# Patient Record
Sex: MALE | Race: OTHER | URBAN - METROPOLITAN AREA
[De-identification: names, ages, dates, MRNs, and addresses within clinical notes are randomized per-mention and may not be internally consistent; named-entity substitution may affect disease eponyms.]

---

## 2018-12-20 ENCOUNTER — EMERGENCY (EMERGENCY)
Facility: HOSPITAL | Age: 33
LOS: 0 days | Discharge: HOME | End: 2018-12-21
Attending: EMERGENCY MEDICINE | Admitting: EMERGENCY MEDICINE
Payer: COMMERCIAL

## 2018-12-20 VITALS — WEIGHT: 184.09 LBS | HEIGHT: 68 IN

## 2018-12-20 VITALS
DIASTOLIC BLOOD PRESSURE: 115 MMHG | SYSTOLIC BLOOD PRESSURE: 156 MMHG | RESPIRATION RATE: 18 BRPM | OXYGEN SATURATION: 99 % | HEIGHT: 67 IN | TEMPERATURE: 100 F | HEART RATE: 118 BPM | WEIGHT: 184.09 LBS

## 2018-12-20 PROCEDURE — 99285 EMERGENCY DEPT VISIT HI MDM: CPT | Mod: 25

## 2018-12-20 PROCEDURE — 99053 MED SERV 10PM-8AM 24 HR FAC: CPT

## 2018-12-20 RX ORDER — LIDOCAINE 4 G/100G
1 CREAM TOPICAL ONCE
Qty: 0 | Refills: 0 | Status: COMPLETED | OUTPATIENT
Start: 2018-12-20 | End: 2018-12-20

## 2018-12-20 RX ORDER — KETOROLAC TROMETHAMINE 30 MG/ML
30 SYRINGE (ML) INJECTION ONCE
Qty: 0 | Refills: 0 | Status: DISCONTINUED | OUTPATIENT
Start: 2018-12-20 | End: 2018-12-20

## 2018-12-20 RX ORDER — ACETAMINOPHEN 500 MG
975 TABLET ORAL ONCE
Qty: 0 | Refills: 0 | Status: COMPLETED | OUTPATIENT
Start: 2018-12-20 | End: 2018-12-21

## 2018-12-20 RX ADMIN — LIDOCAINE 1 PATCH: 4 CREAM TOPICAL at 23:59

## 2018-12-20 NOTE — ED PROVIDER NOTE - OBJECTIVE STATEMENT
32 y/o male in ED c/o lower back pain/spasms x 2 days.   states taking tylenol with minimal relief.    pt denies any trauma.   pt states works as  but has been on disability x months due to left shoulder pain.   pt denies any fever, HA, neck pain, cp, sob, n/v/d/abd pain

## 2018-12-20 NOTE — ED PROVIDER NOTE - NSFOLLOWUPINSTRUCTIONS_ED_ALL_ED_FT
please follow up with your doctor in 2-3 days.   take motrin and tylenol for pain.   may use ice or heating pads for comfort.   return to ED for any concerns

## 2018-12-21 VITALS
HEART RATE: 110 BPM | OXYGEN SATURATION: 98 % | DIASTOLIC BLOOD PRESSURE: 98 MMHG | SYSTOLIC BLOOD PRESSURE: 157 MMHG | RESPIRATION RATE: 16 BRPM

## 2018-12-21 VITALS — HEIGHT: 67 IN | WEIGHT: 184.09 LBS

## 2018-12-21 DIAGNOSIS — M54.5 LOW BACK PAIN: ICD-10-CM

## 2018-12-21 DIAGNOSIS — M54.9 DORSALGIA, UNSPECIFIED: ICD-10-CM

## 2018-12-21 DIAGNOSIS — M62.830 MUSCLE SPASM OF BACK: ICD-10-CM

## 2018-12-21 PROCEDURE — 99284 EMERGENCY DEPT VISIT MOD MDM: CPT

## 2018-12-21 RX ORDER — CYCLOBENZAPRINE HYDROCHLORIDE 10 MG/1
10 TABLET, FILM COATED ORAL ONCE
Qty: 0 | Refills: 0 | Status: COMPLETED | OUTPATIENT
Start: 2018-12-21 | End: 2018-12-21

## 2018-12-21 RX ORDER — CYCLOBENZAPRINE HYDROCHLORIDE 10 MG/1
1 TABLET, FILM COATED ORAL
Qty: 15 | Refills: 0
Start: 2018-12-21 | End: 2018-12-25

## 2018-12-21 RX ADMIN — CYCLOBENZAPRINE HYDROCHLORIDE 10 MILLIGRAM(S): 10 TABLET, FILM COATED ORAL at 13:16

## 2018-12-21 RX ADMIN — Medication 30 MILLIGRAM(S): at 00:00

## 2018-12-21 RX ADMIN — Medication 500 MILLIGRAM(S): at 13:16

## 2018-12-21 RX ADMIN — Medication 975 MILLIGRAM(S): at 00:00

## 2018-12-21 NOTE — ED STATDOCS - NS_ ATTENDINGSCRIBEDETAILS _ED_A_ED_FT
I, Harry Caldera MD,  performed the initial face to face bedside interview with this patient regarding history of present illness, review of symptoms and relevant past medical, social and family history.  I completed an independent physical examination.    The history, relevant review of systems, past medical and surgical history, medical decision making, and physical examination was documented by the scribe in my presence and I attest to the accuracy of the documentation.

## 2018-12-21 NOTE — ED STATDOCS - OBJECTIVE STATEMENT
34 y/o male with no relevant PMHx presents to the ED c/o back pain x2 days. Pt was seen in ED last night, but still with back pain so came back into ED. Pt has had similar sx in the past, improved with Valium. Pt has been taking Tylenol with minimal relief, was given a lidocaine patch in ED last night with no relief. Denies any trauma. NKDA.

## 2018-12-21 NOTE — ED STATDOCS - ATTENDING CONTRIBUTION TO CARE
I, Harry Caldera MD,  performed the initial face to face bedside interview with this patient regarding history of present illness, review of symptoms and relevant past medical, social and family history.  I completed an independent physical examination.  I was the initial provider who evaluated this patient. I have signed out the follow up of any pending tests (i.e. labs, radiological studies) to the ACP.  I have communicated the patient’s plan of care and disposition with the ACP.

## 2018-12-21 NOTE — ED STATDOCS - PROGRESS NOTE DETAILS
32 yo male with a PMH of liver transplant presents with b/l lower back pain x 2 days. Was seen in  ED last night, ordered medication and felt slightly better but now worse. Pain feels like spasm. Denies any trauma, fall. Will prescribe NSAIDs and muscles relaxer. Pt states parents are here who will drive him home. -Joshua Mayo PA-C

## 2018-12-21 NOTE — ED STATDOCS - CARE PLAN
Principal Discharge DX:	Acute bilateral low back pain without sciatica  Secondary Diagnosis:	Back spasm

## 2018-12-21 NOTE — ED ADULT NURSE NOTE - OBJECTIVE STATEMENT
pt. c/o of lower back spasm. pt. denies trauma, straining. pt. states pain began in the morning and got worse throughout the day. pt states hx of back spasms.

## 2018-12-21 NOTE — ED ADULT TRIAGE NOTE - CHIEF COMPLAINT QUOTE
pt presents to ed with back spasms, and was seen this AM in main ed. pt denies injury and denies numbness.

## 2018-12-21 NOTE — ED ADULT NURSE NOTE - CHPI ED NUR SYMPTOMS NEG
no deformity/no difficulty bearing weight/no abrasion/no numbness/no bruising/no weakness/no fever/no tingling

## 2018-12-21 NOTE — ED ADULT NURSE NOTE - NSIMPLEMENTINTERV_GEN_ALL_ED
Implemented All Universal Safety Interventions:  [place] to call system. Call bell, personal items and telephone within reach. Instruct patient to call for assistance. Room bathroom lighting operational. Non-slip footwear when patient is off stretcher. Physically safe environment: no spills, clutter or unnecessary equipment. Stretcher in lowest position, wheels locked, appropriate side rails in place.

## 2018-12-22 DIAGNOSIS — M54.5 LOW BACK PAIN: ICD-10-CM

## 2018-12-22 DIAGNOSIS — M54.9 DORSALGIA, UNSPECIFIED: ICD-10-CM

## 2019-09-17 NOTE — ED ADULT TRIAGE NOTE - NS ED NURSE BANDS TYPE
Name band; Yandy Bright M.D.:   patient awake alert seen sitting on stretcher NAD .   LUNGS CTAB no wheeze no crackle.   CARD RRR no m/r/g.    Abdomen soft NT ND no rebound no guarding no CVA tenderness.  exam performed with MD Harry in room. significant swelling to right testicle, fluctuant, nontender to palpation, no crepitus, no voerlying erythema or redness. no penile lesiosn or discharge.  EXT WWP no edema no calf tenderness CV 2+DP/PT bilaterally.   neuro A&Ox3 gait normal.    skin warm and dry no rash  HEENT: moist mucous membranes, PERRL, EOMI

## 2019-10-01 ENCOUNTER — INPATIENT (INPATIENT)
Facility: HOSPITAL | Age: 34
LOS: 3 days | Discharge: HOME | DRG: 305 | End: 2019-10-05
Attending: HOSPITALIST | Admitting: HOSPITALIST
Payer: COMMERCIAL

## 2019-10-01 VITALS
SYSTOLIC BLOOD PRESSURE: 197 MMHG | HEART RATE: 87 BPM | WEIGHT: 190.04 LBS | TEMPERATURE: 99 F | OXYGEN SATURATION: 97 % | RESPIRATION RATE: 18 BRPM | DIASTOLIC BLOOD PRESSURE: 147 MMHG | HEIGHT: 67 IN

## 2019-10-01 LAB
ALBUMIN SERPL ELPH-MCNC: 3.7 G/DL — SIGNIFICANT CHANGE UP (ref 3.3–5)
ALP SERPL-CCNC: 152 U/L — HIGH (ref 40–120)
ALT FLD-CCNC: 41 U/L — SIGNIFICANT CHANGE UP (ref 12–78)
ANION GAP SERPL CALC-SCNC: 5 MMOL/L — SIGNIFICANT CHANGE UP (ref 5–17)
APPEARANCE UR: CLEAR — SIGNIFICANT CHANGE UP
APTT BLD: 36.2 SEC — SIGNIFICANT CHANGE UP (ref 27.5–36.3)
AST SERPL-CCNC: 38 U/L — HIGH (ref 15–37)
BILIRUB SERPL-MCNC: 1.1 MG/DL — SIGNIFICANT CHANGE UP (ref 0.2–1.2)
BILIRUB UR-MCNC: ABNORMAL
BUN SERPL-MCNC: 19 MG/DL — SIGNIFICANT CHANGE UP (ref 7–23)
CALCIUM SERPL-MCNC: 8.8 MG/DL — SIGNIFICANT CHANGE UP (ref 8.5–10.1)
CHLORIDE SERPL-SCNC: 98 MMOL/L — SIGNIFICANT CHANGE UP (ref 96–108)
CO2 SERPL-SCNC: 30 MMOL/L — SIGNIFICANT CHANGE UP (ref 22–31)
COLOR SPEC: YELLOW — SIGNIFICANT CHANGE UP
CREAT SERPL-MCNC: 1.32 MG/DL — HIGH (ref 0.5–1.3)
DIFF PNL FLD: ABNORMAL
GLUCOSE SERPL-MCNC: 79 MG/DL — SIGNIFICANT CHANGE UP (ref 70–99)
GLUCOSE UR QL: NEGATIVE MG/DL — SIGNIFICANT CHANGE UP
HCT VFR BLD CALC: 58.4 % — CRITICAL HIGH (ref 39–50)
HGB BLD-MCNC: 20.2 G/DL — CRITICAL HIGH (ref 13–17)
INR BLD: 1.07 RATIO — SIGNIFICANT CHANGE UP (ref 0.88–1.16)
KETONES UR-MCNC: ABNORMAL
LEUKOCYTE ESTERASE UR-ACNC: ABNORMAL
MCHC RBC-ENTMCNC: 26.8 PG — LOW (ref 27–34)
MCHC RBC-ENTMCNC: 34.6 GM/DL — SIGNIFICANT CHANGE UP (ref 32–36)
MCV RBC AUTO: 77.4 FL — LOW (ref 80–100)
NITRITE UR-MCNC: NEGATIVE — SIGNIFICANT CHANGE UP
NT-PROBNP SERPL-SCNC: 134 PG/ML — HIGH (ref 0–125)
PH UR: 6 — SIGNIFICANT CHANGE UP (ref 5–8)
PLATELET # BLD AUTO: 221 K/UL — SIGNIFICANT CHANGE UP (ref 150–400)
POTASSIUM SERPL-MCNC: 3 MMOL/L — LOW (ref 3.5–5.3)
POTASSIUM SERPL-SCNC: 3 MMOL/L — LOW (ref 3.5–5.3)
PROT SERPL-MCNC: 8.9 GM/DL — HIGH (ref 6–8.3)
PROT UR-MCNC: 500 MG/DL
PROTHROM AB SERPL-ACNC: 11.9 SEC — SIGNIFICANT CHANGE UP (ref 10–12.9)
RBC # BLD: 7.55 M/UL — HIGH (ref 4.2–5.8)
RBC # FLD: 16.9 % — HIGH (ref 10.3–14.5)
SODIUM SERPL-SCNC: 133 MMOL/L — LOW (ref 135–145)
SP GR SPEC: 1.02 — SIGNIFICANT CHANGE UP (ref 1.01–1.02)
TROPONIN I SERPL-MCNC: <0.015 NG/ML — SIGNIFICANT CHANGE UP (ref 0.01–0.04)
UROBILINOGEN FLD QL: 1 MG/DL
WBC # BLD: 6.8 K/UL — SIGNIFICANT CHANGE UP (ref 3.8–10.5)
WBC # FLD AUTO: 6.8 K/UL — SIGNIFICANT CHANGE UP (ref 3.8–10.5)

## 2019-10-01 PROCEDURE — 70450 CT HEAD/BRAIN W/O DYE: CPT | Mod: 26

## 2019-10-01 PROCEDURE — 71046 X-RAY EXAM CHEST 2 VIEWS: CPT | Mod: 26

## 2019-10-01 RX ORDER — HYDRALAZINE HCL 50 MG
10 TABLET ORAL ONCE
Refills: 0 | Status: COMPLETED | OUTPATIENT
Start: 2019-10-01 | End: 2019-10-01

## 2019-10-01 RX ORDER — AMLODIPINE BESYLATE 2.5 MG/1
10 TABLET ORAL ONCE
Refills: 0 | Status: COMPLETED | OUTPATIENT
Start: 2019-10-01 | End: 2019-10-01

## 2019-10-01 RX ORDER — POTASSIUM CHLORIDE 20 MEQ
10 PACKET (EA) ORAL
Refills: 0 | Status: COMPLETED | OUTPATIENT
Start: 2019-10-01 | End: 2019-10-02

## 2019-10-01 RX ORDER — SODIUM CHLORIDE 9 MG/ML
1000 INJECTION INTRAMUSCULAR; INTRAVENOUS; SUBCUTANEOUS ONCE
Refills: 0 | Status: COMPLETED | OUTPATIENT
Start: 2019-10-01 | End: 2019-10-01

## 2019-10-01 RX ADMIN — AMLODIPINE BESYLATE 10 MILLIGRAM(S): 2.5 TABLET ORAL at 23:10

## 2019-10-01 RX ADMIN — Medication 10 MILLIGRAM(S): at 23:10

## 2019-10-01 NOTE — ED STATDOCS - PMH
Hemochromatosis, unspecified hemochromatosis type    Liver transplanted    No pertinent past medical history
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
## 2019-10-01 NOTE — ED STATDOCS - CLINICAL SUMMARY MEDICAL DECISION MAKING FREE TEXT BOX
Will send pt to main ED for further evaluation. hypertensive urgency will get labs, ekg, ct head and give hydralazine and norvasc
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
## 2019-10-01 NOTE — ED ADULT NURSE NOTE - NSIMPLEMENTINTERV_GEN_ALL_ED
Implemented All Universal Safety Interventions:  [place] to call system. Call bell, personal items and telephone within reach. Instruct patient to call for assistance. Room bathroom lighting operational. Non-slip footwear when patient is off stretcher. Physically safe environment: no spills, clutter or unnecessary equipment. Stretcher in lowest position, wheels locked, appropriate side rails in place.
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
## 2019-10-01 NOTE — ED STATDOCS - CARE PLAN
Principal Discharge DX:	Hypertensive urgency  Secondary Diagnosis:	Hypokalemia  Secondary Diagnosis:	Elevated hemoglobin

## 2019-10-02 DIAGNOSIS — I16.0 HYPERTENSIVE URGENCY: ICD-10-CM

## 2019-10-02 DIAGNOSIS — E87.6 HYPOKALEMIA: ICD-10-CM

## 2019-10-02 DIAGNOSIS — Z94.4 LIVER TRANSPLANT STATUS: Chronic | ICD-10-CM

## 2019-10-02 LAB
BASOPHILS # BLD AUTO: 0.05 K/UL — SIGNIFICANT CHANGE UP (ref 0–0.2)
BASOPHILS NFR BLD AUTO: 0.7 % — SIGNIFICANT CHANGE UP (ref 0–2)
EOSINOPHIL # BLD AUTO: 0.34 K/UL — SIGNIFICANT CHANGE UP (ref 0–0.5)
EOSINOPHIL NFR BLD AUTO: 4.9 % — SIGNIFICANT CHANGE UP (ref 0–6)
HCT VFR BLD CALC: 50.3 % — HIGH (ref 39–50)
HGB BLD-MCNC: 18.2 G/DL — HIGH (ref 13–17)
IMM GRANULOCYTES NFR BLD AUTO: 0.1 % — SIGNIFICANT CHANGE UP (ref 0–1.5)
LYMPHOCYTES # BLD AUTO: 1.64 K/UL — SIGNIFICANT CHANGE UP (ref 1–3.3)
LYMPHOCYTES # BLD AUTO: 23.5 % — SIGNIFICANT CHANGE UP (ref 13–44)
MCHC RBC-ENTMCNC: 27.5 PG — SIGNIFICANT CHANGE UP (ref 27–34)
MCHC RBC-ENTMCNC: 36.2 GM/DL — HIGH (ref 32–36)
MCV RBC AUTO: 75.9 FL — LOW (ref 80–100)
MONOCYTES # BLD AUTO: 0.67 K/UL — SIGNIFICANT CHANGE UP (ref 0–0.9)
MONOCYTES NFR BLD AUTO: 9.6 % — SIGNIFICANT CHANGE UP (ref 2–14)
NEUTROPHILS # BLD AUTO: 4.27 K/UL — SIGNIFICANT CHANGE UP (ref 1.8–7.4)
NEUTROPHILS NFR BLD AUTO: 61.2 % — SIGNIFICANT CHANGE UP (ref 43–77)
PLATELET # BLD AUTO: 193 K/UL — SIGNIFICANT CHANGE UP (ref 150–400)
RBC # BLD: 6.63 M/UL — HIGH (ref 4.2–5.8)
RBC # FLD: 16.1 % — HIGH (ref 10.3–14.5)
TSH SERPL-MCNC: 1.74 UU/ML — SIGNIFICANT CHANGE UP (ref 0.34–4.82)
WBC # BLD: 6.98 K/UL — SIGNIFICANT CHANGE UP (ref 3.8–10.5)
WBC # FLD AUTO: 6.98 K/UL — SIGNIFICANT CHANGE UP (ref 3.8–10.5)

## 2019-10-02 PROCEDURE — 36415 COLL VENOUS BLD VENIPUNCTURE: CPT

## 2019-10-02 PROCEDURE — 93010 ELECTROCARDIOGRAM REPORT: CPT

## 2019-10-02 PROCEDURE — 83540 ASSAY OF IRON: CPT

## 2019-10-02 PROCEDURE — 84244 ASSAY OF RENIN: CPT

## 2019-10-02 PROCEDURE — 93306 TTE W/DOPPLER COMPLETE: CPT | Mod: 26

## 2019-10-02 PROCEDURE — 80197 ASSAY OF TACROLIMUS: CPT

## 2019-10-02 PROCEDURE — 83735 ASSAY OF MAGNESIUM: CPT

## 2019-10-02 PROCEDURE — 82088 ASSAY OF ALDOSTERONE: CPT

## 2019-10-02 PROCEDURE — 83835 ASSAY OF METANEPHRINES: CPT

## 2019-10-02 PROCEDURE — 80053 COMPREHEN METABOLIC PANEL: CPT

## 2019-10-02 PROCEDURE — C8902: CPT

## 2019-10-02 PROCEDURE — 82384 ASSAY THREE CATECHOLAMINES: CPT

## 2019-10-02 PROCEDURE — 84443 ASSAY THYROID STIM HORMONE: CPT

## 2019-10-02 PROCEDURE — 93005 ELECTROCARDIOGRAM TRACING: CPT

## 2019-10-02 PROCEDURE — 82728 ASSAY OF FERRITIN: CPT

## 2019-10-02 PROCEDURE — 83550 IRON BINDING TEST: CPT

## 2019-10-02 PROCEDURE — 93306 TTE W/DOPPLER COMPLETE: CPT

## 2019-10-02 PROCEDURE — A9579: CPT

## 2019-10-02 PROCEDURE — 85027 COMPLETE CBC AUTOMATED: CPT

## 2019-10-02 PROCEDURE — 80048 BASIC METABOLIC PNL TOTAL CA: CPT

## 2019-10-02 PROCEDURE — 93975 VASCULAR STUDY: CPT

## 2019-10-02 PROCEDURE — 85025 COMPLETE CBC W/AUTO DIFF WBC: CPT

## 2019-10-02 RX ORDER — AMLODIPINE BESYLATE 2.5 MG/1
10 TABLET ORAL DAILY
Refills: 0 | Status: DISCONTINUED | OUTPATIENT
Start: 2019-10-02 | End: 2019-10-05

## 2019-10-02 RX ORDER — HYDRALAZINE HCL 50 MG
5 TABLET ORAL THREE TIMES A DAY
Refills: 0 | Status: DISCONTINUED | OUTPATIENT
Start: 2019-10-02 | End: 2019-10-02

## 2019-10-02 RX ORDER — TACROLIMUS 5 MG/1
1 CAPSULE ORAL
Qty: 0 | Refills: 0 | DISCHARGE

## 2019-10-02 RX ORDER — TACROLIMUS 5 MG/1
2 CAPSULE ORAL EVERY 12 HOURS
Refills: 0 | Status: DISCONTINUED | OUTPATIENT
Start: 2019-10-02 | End: 2019-10-05

## 2019-10-02 RX ORDER — TACROLIMUS 5 MG/1
2 CAPSULE ORAL
Qty: 0 | Refills: 0 | DISCHARGE

## 2019-10-02 RX ORDER — TACROLIMUS 5 MG/1
1 CAPSULE ORAL ONCE
Refills: 0 | Status: COMPLETED | OUTPATIENT
Start: 2019-10-02 | End: 2019-10-02

## 2019-10-02 RX ORDER — AMLODIPINE BESYLATE 2.5 MG/1
5 TABLET ORAL DAILY
Refills: 0 | Status: DISCONTINUED | OUTPATIENT
Start: 2019-10-02 | End: 2019-10-02

## 2019-10-02 RX ORDER — ACETAMINOPHEN 500 MG
650 TABLET ORAL EVERY 6 HOURS
Refills: 0 | Status: DISCONTINUED | OUTPATIENT
Start: 2019-10-02 | End: 2019-10-05

## 2019-10-02 RX ORDER — ACETAMINOPHEN 500 MG
1 TABLET ORAL
Qty: 0 | Refills: 0 | DISCHARGE

## 2019-10-02 RX ORDER — HYDRALAZINE HCL 50 MG
5 TABLET ORAL EVERY 6 HOURS
Refills: 0 | Status: DISCONTINUED | OUTPATIENT
Start: 2019-10-02 | End: 2019-10-02

## 2019-10-02 RX ORDER — HYDRALAZINE HCL 50 MG
5 TABLET ORAL EVERY 6 HOURS
Refills: 0 | Status: DISCONTINUED | OUTPATIENT
Start: 2019-10-02 | End: 2019-10-05

## 2019-10-02 RX ORDER — TACROLIMUS 5 MG/1
1 CAPSULE ORAL EVERY 12 HOURS
Refills: 0 | Status: DISCONTINUED | OUTPATIENT
Start: 2019-10-02 | End: 2019-10-02

## 2019-10-02 RX ADMIN — Medication 100 MILLIEQUIVALENT(S): at 00:13

## 2019-10-02 RX ADMIN — Medication 100 MILLIEQUIVALENT(S): at 03:00

## 2019-10-02 RX ADMIN — Medication 650 MILLIGRAM(S): at 09:19

## 2019-10-02 RX ADMIN — Medication 650 MILLIGRAM(S): at 16:36

## 2019-10-02 RX ADMIN — Medication 10 MILLIEQUIVALENT(S): at 02:25

## 2019-10-02 RX ADMIN — Medication 5 MILLIGRAM(S): at 04:13

## 2019-10-02 RX ADMIN — Medication 10 MILLIEQUIVALENT(S): at 01:05

## 2019-10-02 RX ADMIN — AMLODIPINE BESYLATE 10 MILLIGRAM(S): 2.5 TABLET ORAL at 16:35

## 2019-10-02 RX ADMIN — SODIUM CHLORIDE 1000 MILLILITER(S): 9 INJECTION INTRAMUSCULAR; INTRAVENOUS; SUBCUTANEOUS at 00:13

## 2019-10-02 RX ADMIN — TACROLIMUS 1 MILLIGRAM(S): 5 CAPSULE ORAL at 04:41

## 2019-10-02 RX ADMIN — Medication 650 MILLIGRAM(S): at 18:54

## 2019-10-02 RX ADMIN — TACROLIMUS 2 MILLIGRAM(S): 5 CAPSULE ORAL at 18:02

## 2019-10-02 RX ADMIN — Medication 5 MILLIGRAM(S): at 16:59

## 2019-10-02 RX ADMIN — TACROLIMUS 2 MILLIGRAM(S): 5 CAPSULE ORAL at 18:00

## 2019-10-02 RX ADMIN — TACROLIMUS 1 MILLIGRAM(S): 5 CAPSULE ORAL at 05:10

## 2019-10-02 RX ADMIN — Medication 100 MILLIEQUIVALENT(S): at 01:25

## 2019-10-02 RX ADMIN — Medication 650 MILLIGRAM(S): at 10:34

## 2019-10-02 RX ADMIN — Medication 5 MILLIGRAM(S): at 23:29

## 2019-10-02 RX ADMIN — SODIUM CHLORIDE 1000 MILLILITER(S): 9 INJECTION INTRAMUSCULAR; INTRAVENOUS; SUBCUTANEOUS at 02:13

## 2019-10-02 RX ADMIN — Medication 5 MILLIGRAM(S): at 11:50

## 2019-10-02 NOTE — H&P ADULT - NSHPPHYSICALEXAM_GEN_ALL_CORE
Vital Signs Last 24 Hrs  T(C): 36.6 (02 Oct 2019 03:37), Max: 37 (01 Oct 2019 20:34)  T(F): 97.9 (02 Oct 2019 03:37), Max: 98.6 (01 Oct 2019 20:34)  HR: 97 (02 Oct 2019 03:37) (87 - 97)  BP: 170/110 (02 Oct 2019 03:37) (170/110 - 197/147)  BP(mean): --  RR: 18 (01 Oct 2019 20:34) (18 - 18)  SpO2: 99% (02 Oct 2019 03:37) (97% - 99%)

## 2019-10-02 NOTE — PROGRESS NOTE ADULT - ASSESSMENT
34 year old male patient with elevated blood pressure and polycythemia     Hypertensive Urgency/Emergency  -blurry vision has resolved, still has HA  -f/u renin/Aldosterone levels, chaecholamines, JAK2, metanephrine urine  - IV hydralazine PRN  -amlodipine increased to 10mg  -vital signs per routine    Hypokalemia   -repleted  -follow up am potassium    Hyponatremia  -Na of 133  -f/u am labs  -consider sending out serum osms if persistently low    MARY  -likely pre-renal  -follow up BMP    Polycythemia  -Pt endorsed a history of Hemachromatosis that required frequent blood drawers with a Hematologist  -Hematology consult f/u    Liver transplant recipient  -cont prograf 2 mg BID    DVT ppx  -ambulation
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
## 2019-10-02 NOTE — PHARMACOTHERAPY INTERVENTION NOTE - COMMENTS
recommend increase amlodipine dose to 10 mg (outpatient dose). Patient endorses non-compliance with meds for >6 months

## 2019-10-02 NOTE — PHARMACOTHERAPY INTERVENTION NOTE - COMMENTS
Med rec is complete. Checked [facility] for med hx and confirmed with patient about his meds. Patient states that he has not taken his Norsvasc over 6 months now, because he did not have a PCP to provide the new RX and also had an insurance lapse; has new insurance now.

## 2019-10-02 NOTE — H&P ADULT - NSICDXPASTMEDICALHX_GEN_ALL_CORE_FT
PAST MEDICAL HISTORY:  Hemochromatosis, unspecified hemochromatosis type     Liver transplanted     No pertinent past medical history
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
## 2019-10-02 NOTE — ED ADULT NURSE REASSESSMENT NOTE - NS ED NURSE REASSESS COMMENT FT1
Pt alert and oriented-BP still elevated-Dr. Lopez made aware, 1x dose of hydralazine ordered and a prn dose of Norvasc PO ordered. Pt given hydralazine with small improvement-per Dr. Lopez pt okay to go to floor. Plan is to bring BP down slowly. Pt's transplant medication ordered-per pt "I take 2 mg twice daily, and I missed last nights dose". Dr. Lopez called and made aware-stat dose ordered and given and floor made aware that todays dose needs to be staggered due to missed dose. Pt ready for transport to floor-report given and pt put in for transport.
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
## 2019-10-03 DIAGNOSIS — D75.1 SECONDARY POLYCYTHEMIA: ICD-10-CM

## 2019-10-03 DIAGNOSIS — I16.0 HYPERTENSIVE URGENCY: ICD-10-CM

## 2019-10-03 LAB
ADD ON TEST-SPECIMEN IN LAB: SIGNIFICANT CHANGE UP
ALBUMIN SERPL ELPH-MCNC: 3.5 G/DL — SIGNIFICANT CHANGE UP (ref 3.3–5)
ALDOST SERPL-MCNC: 28.5 NG/DL — HIGH
ALP SERPL-CCNC: 134 U/L — HIGH (ref 40–120)
ALT FLD-CCNC: 36 U/L — SIGNIFICANT CHANGE UP (ref 12–78)
ANION GAP SERPL CALC-SCNC: 9 MMOL/L — SIGNIFICANT CHANGE UP (ref 5–17)
AST SERPL-CCNC: 30 U/L — SIGNIFICANT CHANGE UP (ref 15–37)
BILIRUB SERPL-MCNC: 1 MG/DL — SIGNIFICANT CHANGE UP (ref 0.2–1.2)
BUN SERPL-MCNC: 16 MG/DL — SIGNIFICANT CHANGE UP (ref 7–23)
CALCIUM SERPL-MCNC: 8.7 MG/DL — SIGNIFICANT CHANGE UP (ref 8.5–10.1)
CHLORIDE SERPL-SCNC: 98 MMOL/L — SIGNIFICANT CHANGE UP (ref 96–108)
CO2 SERPL-SCNC: 25 MMOL/L — SIGNIFICANT CHANGE UP (ref 22–31)
CREAT SERPL-MCNC: 1.1 MG/DL — SIGNIFICANT CHANGE UP (ref 0.5–1.3)
FERRITIN SERPL-MCNC: 55 NG/ML — SIGNIFICANT CHANGE UP (ref 30–400)
GLUCOSE SERPL-MCNC: 114 MG/DL — HIGH (ref 70–99)
HCT VFR BLD CALC: 54.6 % — HIGH (ref 39–50)
HGB BLD-MCNC: 18.7 G/DL — HIGH (ref 13–17)
IRON SATN MFR SERPL: 24 % — SIGNIFICANT CHANGE UP (ref 16–55)
IRON SATN MFR SERPL: 90 UG/DL — SIGNIFICANT CHANGE UP (ref 45–165)
MCHC RBC-ENTMCNC: 26.5 PG — LOW (ref 27–34)
MCHC RBC-ENTMCNC: 34.2 GM/DL — SIGNIFICANT CHANGE UP (ref 32–36)
MCV RBC AUTO: 77.3 FL — LOW (ref 80–100)
PLATELET # BLD AUTO: 229 K/UL — SIGNIFICANT CHANGE UP (ref 150–400)
POTASSIUM SERPL-MCNC: 3 MMOL/L — LOW (ref 3.5–5.3)
POTASSIUM SERPL-SCNC: 3 MMOL/L — LOW (ref 3.5–5.3)
PROT SERPL-MCNC: 8.5 GM/DL — HIGH (ref 6–8.3)
RBC # BLD: 7.06 M/UL — HIGH (ref 4.2–5.8)
RBC # FLD: 16.8 % — HIGH (ref 10.3–14.5)
RENIN DIRECT, PLASMA: 65.7 PG/ML — HIGH
SODIUM SERPL-SCNC: 132 MMOL/L — LOW (ref 135–145)
TIBC SERPL-MCNC: 376 UG/DL — SIGNIFICANT CHANGE UP (ref 220–430)
UIBC SERPL-MCNC: 286 UG/DL — SIGNIFICANT CHANGE UP (ref 110–370)
WBC # BLD: 8.47 K/UL — SIGNIFICANT CHANGE UP (ref 3.8–10.5)
WBC # FLD AUTO: 8.47 K/UL — SIGNIFICANT CHANGE UP (ref 3.8–10.5)

## 2019-10-03 PROCEDURE — 93010 ELECTROCARDIOGRAM REPORT: CPT

## 2019-10-03 RX ORDER — POTASSIUM CHLORIDE 20 MEQ
10 PACKET (EA) ORAL
Refills: 0 | Status: DISCONTINUED | OUTPATIENT
Start: 2019-10-03 | End: 2019-10-03

## 2019-10-03 RX ORDER — LISINOPRIL 2.5 MG/1
5 TABLET ORAL DAILY
Refills: 0 | Status: DISCONTINUED | OUTPATIENT
Start: 2019-10-03 | End: 2019-10-05

## 2019-10-03 RX ORDER — HYDRALAZINE HCL 50 MG
10 TABLET ORAL ONCE
Refills: 0 | Status: COMPLETED | OUTPATIENT
Start: 2019-10-03 | End: 2019-10-03

## 2019-10-03 RX ORDER — LANOLIN ALCOHOL/MO/W.PET/CERES
5 CREAM (GRAM) TOPICAL AT BEDTIME
Refills: 0 | Status: DISCONTINUED | OUTPATIENT
Start: 2019-10-03 | End: 2019-10-05

## 2019-10-03 RX ORDER — POTASSIUM CHLORIDE 20 MEQ
40 PACKET (EA) ORAL EVERY 4 HOURS
Refills: 0 | Status: COMPLETED | OUTPATIENT
Start: 2019-10-03 | End: 2019-10-03

## 2019-10-03 RX ADMIN — TACROLIMUS 2 MILLIGRAM(S): 5 CAPSULE ORAL at 05:21

## 2019-10-03 RX ADMIN — LISINOPRIL 5 MILLIGRAM(S): 2.5 TABLET ORAL at 17:13

## 2019-10-03 RX ADMIN — Medication 40 MILLIEQUIVALENT(S): at 11:50

## 2019-10-03 RX ADMIN — Medication 650 MILLIGRAM(S): at 03:16

## 2019-10-03 RX ADMIN — Medication 40 MILLIEQUIVALENT(S): at 17:13

## 2019-10-03 RX ADMIN — Medication 10 MILLIGRAM(S): at 03:15

## 2019-10-03 RX ADMIN — AMLODIPINE BESYLATE 10 MILLIGRAM(S): 2.5 TABLET ORAL at 05:21

## 2019-10-03 RX ADMIN — TACROLIMUS 2 MILLIGRAM(S): 5 CAPSULE ORAL at 17:13

## 2019-10-03 RX ADMIN — Medication 650 MILLIGRAM(S): at 02:46
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
## 2019-10-03 NOTE — CONSULT NOTE ADULT - PROBLEM SELECTOR RECOMMENDATION 2
likely secondary to Prograf and patient's noncompliance with antihypertensive medications    treatment as per medicine

## 2019-10-03 NOTE — PROGRESS NOTE ADULT - ATTENDING COMMENTS
Patient seen and examined with Family Medicine Residents Antwan Armendariz, Vicenta Triana, Orlando Flores and Poppy Moses on the Family Medicine Teaching Service.  Agree with history, physical, labs and plan which were reviewed in detail after a face to face encounter with the patient.
Patient seen and examined with Family Medicine Residents Antwan Armendariz, Orlando Flores and Poppy Moses on the Family Medicine Teaching Service.  Agree with history, physical, labs and plan which were reviewed in detail after a face to face encounter with the patient.

## 2019-10-03 NOTE — CONSULT NOTE ADULT - PROBLEM SELECTOR RECOMMENDATION 9
Secondary polycythemia due to Prograf  Phlebotomy can be considered for hematocrit of 55 or above  Patient advised to follow-up in our office after discharge  He does not require phlebotomy at this time

## 2019-10-03 NOTE — PROGRESS NOTE ADULT - ASSESSMENT
34 year old male patient with elevated blood pressure and polycythemia     Hypertensive Urgency/Emergency  -blurry vision has resolved, still has HA  -f/u renin/Aldosterone levels, chaecholamines, JAK2, metanephrine urine  - IV hydralazine PRN  -amlodipine increased to 10mg  -vital signs per routine    Hypokalemia   -repleted  -follow up am potassium    Hyponatremia  -Na of 133  -f/u am labs  -consider sending out serum osms if persistently low    MARY  -likely pre-renal  -follow up BMP    Polycythemia  -Pt endorsed a history of Hemachromatosis that required frequent blood drawers with a Hematologist  -Hematology consult f/u    Liver transplant recipient  -cont prograf 2 mg BID    DVT ppx  -ambulation 34 year old male patient with elevated blood pressure and polycythemia     Hypertensive Urgency/Emergency  -blurry vision has resolved, still has HA  -f/u heme/onc ordered labs  - IV hydralazine PRN with parameters  -amlodipine increased to 10mg  -start lisinopril 5mg PO QD   -vital signs per routine    Hypokalemia   -repleted  -follow up am potassium    Hyponatremia  -Na of 133  -f/u am labs  -consider sending out serum osms if persistently low    MARY  -likely pre-renal  -follow up BMP    Polycythemia  -Pt endorsed a history of Hemachromatosis that required frequent blood drawers with a Hematologist  -Hematology consult appreciated    Liver transplant recipient  -cont prograf 2 mg BID    DVT ppx  -ambulation

## 2019-10-04 LAB
ALDOST SERPL-MCNC: 27.3 NG/DL — HIGH
ANION GAP SERPL CALC-SCNC: 7 MMOL/L — SIGNIFICANT CHANGE UP (ref 5–17)
BUN SERPL-MCNC: 26 MG/DL — HIGH (ref 7–23)
CALCIUM SERPL-MCNC: 8.5 MG/DL — SIGNIFICANT CHANGE UP (ref 8.5–10.1)
CHLORIDE SERPL-SCNC: 105 MMOL/L — SIGNIFICANT CHANGE UP (ref 96–108)
CO2 SERPL-SCNC: 28 MMOL/L — SIGNIFICANT CHANGE UP (ref 22–31)
CREAT SERPL-MCNC: 1.41 MG/DL — HIGH (ref 0.5–1.3)
GLUCOSE SERPL-MCNC: 110 MG/DL — HIGH (ref 70–99)
HCT VFR BLD CALC: 52.4 % — HIGH (ref 39–50)
HGB BLD-MCNC: 17.8 G/DL — HIGH (ref 13–17)
MCHC RBC-ENTMCNC: 26.8 PG — LOW (ref 27–34)
MCHC RBC-ENTMCNC: 34 GM/DL — SIGNIFICANT CHANGE UP (ref 32–36)
MCV RBC AUTO: 78.8 FL — LOW (ref 80–100)
PLATELET # BLD AUTO: 233 K/UL — SIGNIFICANT CHANGE UP (ref 150–400)
POTASSIUM SERPL-MCNC: 3.5 MMOL/L — SIGNIFICANT CHANGE UP (ref 3.5–5.3)
POTASSIUM SERPL-SCNC: 3.5 MMOL/L — SIGNIFICANT CHANGE UP (ref 3.5–5.3)
RBC # BLD: 6.65 M/UL — HIGH (ref 4.2–5.8)
RBC # FLD: 16.6 % — HIGH (ref 10.3–14.5)
RENIN DIRECT, PLASMA: 81.5 PG/ML — HIGH
SODIUM SERPL-SCNC: 140 MMOL/L — SIGNIFICANT CHANGE UP (ref 135–145)
WBC # BLD: 7.05 K/UL — SIGNIFICANT CHANGE UP (ref 3.8–10.5)
WBC # FLD AUTO: 7.05 K/UL — SIGNIFICANT CHANGE UP (ref 3.8–10.5)

## 2019-10-04 PROCEDURE — 74185 MRA ABD W OR W/O CNTRST: CPT | Mod: 26

## 2019-10-04 PROCEDURE — 93975 VASCULAR STUDY: CPT | Mod: 26

## 2019-10-04 PROCEDURE — 93010 ELECTROCARDIOGRAM REPORT: CPT

## 2019-10-04 RX ADMIN — TACROLIMUS 2 MILLIGRAM(S): 5 CAPSULE ORAL at 17:58

## 2019-10-04 RX ADMIN — LISINOPRIL 5 MILLIGRAM(S): 2.5 TABLET ORAL at 05:39

## 2019-10-04 RX ADMIN — AMLODIPINE BESYLATE 10 MILLIGRAM(S): 2.5 TABLET ORAL at 05:39

## 2019-10-04 RX ADMIN — TACROLIMUS 2 MILLIGRAM(S): 5 CAPSULE ORAL at 05:39
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
## 2019-10-04 NOTE — PROGRESS NOTE ADULT - ASSESSMENT
34 year old male patient with elevated blood pressure and polycythemia     Hypertensive Urgency/Emergency  -blurry vision has resolved, still has HA  -f/u heme/onc ordered labs  - IV hydralazine PRN with parameters  -amlodipine increased to 10mg  - lisinopril 5mg PO QD  -Consider PO hydralazine due to elevated Cr  -Nephro consult appreciated  -f/u Renal US  -f/u MRI   -vital signs per routine  -D/c plan for tomorrow    Hypokalemia   -repleted  -follow up am potassium    Hyponatremia  -Na of 133  -f/u am labs  -consider sending out serum osms if persistently low    MARY  -likely pre-renal  -follow up BMP    Polycythemia  -Pt endorsed a history of Hemachromatosis that required frequent blood drawers with a Hematologist  -Hematology consult appreciated    Liver transplant recipient  -cont prograf 2 mg BID    DVT ppx  -ambulation 34 year old male patient with elevated blood pressure and polycythemia     Hypertensive Urgency/Emergency  -blurry vision has resolved, NO C/O HA  -elevated renin/aldosterone  - IV hydralazine PRN with parameters  -amlodipine increased to 10mg  - lisinopril 5mg PO QD  -Consider PO hydralazine due to elevated Cr  -Nephro consult appreciated  -f/u Renal US  -f/u MRI   -vital signs per routine  -D/c plan for tomorrow    Hypokalemia   -repleted  -follow up am potassium    Hyponatremia  -Na of 133  -f/u am labs  -consider sending out serum osms if persistently low    MARY  -likely pre-renal  -follow up BMP    Polycythemia  -Pt endorsed a history of Hemachromatosis that required frequent blood drawers with a Hematologist  -Hematology consult appreciated    Liver transplant recipient  -cont prograf 2 mg BID    DVT ppx  -ambulation

## 2019-10-04 NOTE — PROGRESS NOTE ADULT - SUBJECTIVE AND OBJECTIVE BOX
Pt has been seen and examined with FP resident, resident supervised agree with a/p       Patient is a 34y old  Male who presents with a chief complaint of Hypertensive Emergency (04 Oct 2019 14:17)        PHYSICAL EXAM:  Vital Signs Last 24 Hrs  T(C): 36.6 (04 Oct 2019 11:42), Max: 36.8 (03 Oct 2019 14:42)  T(F): 97.8 (04 Oct 2019 11:42), Max: 98.2 (03 Oct 2019 14:42)  HR: 94 (04 Oct 2019 11:42) (94 - 115)  BP: 137/94 (04 Oct 2019 11:42) (129/91 - 154/93)  BP(mean): --  RR: 19 (04 Oct 2019 11:42) (18 - 20)  SpO2: 98% (04 Oct 2019 11:42) (98% - 100%)  general- comfortable   -rs-aeeb,cta  -cvs-s1s2 normal   -p/a-soft,bs+  -extremity- no asymmetrical swelling noted   -cns- non focal         A/P    #renal doppler to follow    #monitor bp and adjust meds as per reading
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
## 2019-10-05 ENCOUNTER — TRANSCRIPTION ENCOUNTER (OUTPATIENT)
Age: 34
End: 2019-10-05

## 2019-10-05 VITALS
HEART RATE: 108 BPM | OXYGEN SATURATION: 98 % | TEMPERATURE: 97 F | DIASTOLIC BLOOD PRESSURE: 89 MMHG | SYSTOLIC BLOOD PRESSURE: 137 MMHG | RESPIRATION RATE: 19 BRPM

## 2019-10-05 LAB
ANION GAP SERPL CALC-SCNC: 7 MMOL/L — SIGNIFICANT CHANGE UP (ref 5–17)
BUN SERPL-MCNC: 28 MG/DL — HIGH (ref 7–23)
CALCIUM SERPL-MCNC: 8.6 MG/DL — SIGNIFICANT CHANGE UP (ref 8.5–10.1)
CHLORIDE SERPL-SCNC: 102 MMOL/L — SIGNIFICANT CHANGE UP (ref 96–108)
CO2 SERPL-SCNC: 28 MMOL/L — SIGNIFICANT CHANGE UP (ref 22–31)
CREAT SERPL-MCNC: 1.29 MG/DL — SIGNIFICANT CHANGE UP (ref 0.5–1.3)
GLUCOSE SERPL-MCNC: 92 MG/DL — SIGNIFICANT CHANGE UP (ref 70–99)
HCT VFR BLD CALC: 52 % — HIGH (ref 39–50)
HGB BLD-MCNC: 18.1 G/DL — HIGH (ref 13–17)
MCHC RBC-ENTMCNC: 27.2 PG — SIGNIFICANT CHANGE UP (ref 27–34)
MCHC RBC-ENTMCNC: 34.8 GM/DL — SIGNIFICANT CHANGE UP (ref 32–36)
MCV RBC AUTO: 78.2 FL — LOW (ref 80–100)
PLATELET # BLD AUTO: 212 K/UL — SIGNIFICANT CHANGE UP (ref 150–400)
POTASSIUM SERPL-MCNC: 3.4 MMOL/L — LOW (ref 3.5–5.3)
POTASSIUM SERPL-SCNC: 3.4 MMOL/L — LOW (ref 3.5–5.3)
RBC # BLD: 6.65 M/UL — HIGH (ref 4.2–5.8)
RBC # FLD: 16.2 % — HIGH (ref 10.3–14.5)
SODIUM SERPL-SCNC: 137 MMOL/L — SIGNIFICANT CHANGE UP (ref 135–145)
TACROLIMUS SERPL-MCNC: 5.5 NG/ML — SIGNIFICANT CHANGE UP
WBC # BLD: 7.02 K/UL — SIGNIFICANT CHANGE UP (ref 3.8–10.5)
WBC # FLD AUTO: 7.02 K/UL — SIGNIFICANT CHANGE UP (ref 3.8–10.5)

## 2019-10-05 RX ORDER — POTASSIUM CHLORIDE 20 MEQ
40 PACKET (EA) ORAL ONCE
Refills: 0 | Status: COMPLETED | OUTPATIENT
Start: 2019-10-05 | End: 2019-10-05

## 2019-10-05 RX ORDER — LISINOPRIL 2.5 MG/1
1 TABLET ORAL
Qty: 30 | Refills: 0
Start: 2019-10-05 | End: 2019-11-03

## 2019-10-05 RX ORDER — AMLODIPINE BESYLATE 2.5 MG/1
1 TABLET ORAL
Qty: 30 | Refills: 0
Start: 2019-10-05 | End: 2019-11-03

## 2019-10-05 RX ORDER — AMLODIPINE BESYLATE 2.5 MG/1
1 TABLET ORAL
Qty: 0 | Refills: 0 | DISCHARGE

## 2019-10-05 RX ADMIN — AMLODIPINE BESYLATE 10 MILLIGRAM(S): 2.5 TABLET ORAL at 05:38

## 2019-10-05 RX ADMIN — TACROLIMUS 2 MILLIGRAM(S): 5 CAPSULE ORAL at 05:37

## 2019-10-05 RX ADMIN — Medication 5 MILLIGRAM(S): at 05:38

## 2019-10-05 RX ADMIN — Medication 40 MILLIEQUIVALENT(S): at 11:46

## 2019-10-05 RX ADMIN — LISINOPRIL 5 MILLIGRAM(S): 2.5 TABLET ORAL at 05:38
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
## 2019-10-05 NOTE — DISCHARGE NOTE PROVIDER - PROVIDER TOKENS
PROVIDER:[TOKEN:[[id number]],FOLLOWUP:[1 week]],PROVIDER:[TOKEN:[[id number]],FOLLOWUP:[1 week]]

## 2019-10-05 NOTE — DISCHARGE NOTE PROVIDER - NSDCCPCAREPLAN_GEN_ALL_CORE_FT
PRINCIPAL DISCHARGE DIAGNOSIS  Diagnosis: Hypertensive emergency  Assessment and Plan of Treatment: Presented with headache and blurry vision.  On admission blood pressure was 197/147  Initially treated with norvasc and hydralazine.  Blood pressure now stable  Blood pressure was controlled with  Lisinopril 5 mg QD and amlodipine 10 mg QD. Continue these medications.  Avoid eating salty foods.  Follow up at [facility] within 1 week      SECONDARY DISCHARGE DIAGNOSES  Diagnosis: Elevated hemoglobin  Assessment and Plan of Treatment: Hx of polycythemia  Follow up with hematologist/oncologist as an outpatient    Diagnosis: Hypokalemia  Assessment and Plan of Treatment: Potassium supplementation was given  Follow up at [facility] within 1 week. Repeat BMP next week.

## 2019-10-05 NOTE — DISCHARGE NOTE PROVIDER - CARE PROVIDER_API CALL
Co, Jori ALVA)  Internal Medicine  [address] [place] [address]  Phone: [phone]  Fax: [phone]  Follow Up Time: 1 week    Sera Donnelly)  Internal Medicine; Nephrology  [address], [address]  [place] [address]  Phone: [phone]  Fax: [phone]  Follow Up Time: 1 week

## 2019-10-05 NOTE — DISCHARGE NOTE PROVIDER - NSDCFUADDINST_GEN_ALL_CORE_FT
If chest pain, shortness of breath, abdominal pain, diarrhea, fall, loss of consciousness, headache, change in vision, facial droop or weakness occur, return to the emergency department.    Follow up physician at [facility] and Dr. Donnelly, Nephrologist as an outpatient

## 2019-10-05 NOTE — DISCHARGE NOTE NURSING/CASE MANAGEMENT/SOCIAL WORK - PATIENT PORTAL LINK FT
You can access the FollowMyHealth Patient Portal offered by [facility] by registering at the following website: [email or web address]. By joining [employer]’s FollowMyHealth portal, you will also be able to view your health information using other applications (apps) compatible with our system.

## 2019-10-05 NOTE — DISCHARGE NOTE PROVIDER - NSDCCAREPROVSEEN_GEN_ALL_CORE_FT
Marcello, Natalia Lamb, Maksim Triana, Vicenta Donnelly, Sera Moses, Poppy Vences, Johnnie Dow, Jcarlos Schwarz

## 2019-10-05 NOTE — PROGRESS NOTE ADULT - SUBJECTIVE AND OBJECTIVE BOX
Pt has been seen and examined with FP resident, resident supervised agree with a/p       Patient is a 34y old  Male who presents with a chief complaint of Hypertensive Emergency (04 Oct 2019 20:13)        PHYSICAL EXAM:  Vital Signs Last 24 Hrs  T(C): 36.3 (05 Oct 2019 12:04), Max: 36.7 (04 Oct 2019 22:19)  T(F): 97.4 (05 Oct 2019 12:04), Max: 98.1 (04 Oct 2019 22:19)  HR: 108 (05 Oct 2019 12:04) (94 - 108)  BP: 137/89 (05 Oct 2019 12:04) (137/89 - 156/113)  BP(mean): --  RR: 19 (05 Oct 2019 12:04) (18 - 19)  SpO2: 98% (05 Oct 2019 12:04) (95% - 98%)  general- comfortable   -rs-aeeb,cta  -cvs-s1s2 normal   -p/a-soft,bs+  -extremity- no asymmetrical swelling noted   -cns- non focal         A/P    #d/c today, time spent 50 minutes     #Discussed with pt to be compliant with meds and follow up

## 2019-10-06 LAB — RENIN PLAS-CCNC: 4.61 NG/ML/HR — SIGNIFICANT CHANGE UP (ref 0.17–5.38)

## 2019-10-07 LAB
CREAT ?TM UR-MCNC: 172 MG/DL — SIGNIFICANT CHANGE UP (ref 20–320)
DOPAMINE UR-MCNC: 249 MCG/G CR — SIGNIFICANT CHANGE UP (ref 40–390)
EPINEPH UR-MCNC: 8 MCG/G CR — SIGNIFICANT CHANGE UP (ref 2–16)
EPINEPHRINE/NOREPINEPHRINE - RANDOM URINE: 98 MCG/G CR — HIGH (ref 9–74)
NOREPINEPH 24H UR-MCNC: 90 MCG/G CR — HIGH (ref 7–65)

## 2019-10-10 DIAGNOSIS — N17.9 ACUTE KIDNEY FAILURE, UNSPECIFIED: ICD-10-CM

## 2019-10-10 DIAGNOSIS — I16.1 HYPERTENSIVE EMERGENCY: ICD-10-CM

## 2019-10-10 DIAGNOSIS — D75.1 SECONDARY POLYCYTHEMIA: ICD-10-CM

## 2019-10-10 DIAGNOSIS — Z91.14 PATIENT'S OTHER NONCOMPLIANCE WITH MEDICATION REGIMEN: ICD-10-CM

## 2019-10-10 DIAGNOSIS — Z94.4 LIVER TRANSPLANT STATUS: ICD-10-CM

## 2019-10-10 DIAGNOSIS — E87.1 HYPO-OSMOLALITY AND HYPONATREMIA: ICD-10-CM

## 2019-10-10 DIAGNOSIS — E87.6 HYPOKALEMIA: ICD-10-CM

## 2020-12-23 ENCOUNTER — OUTPATIENT (OUTPATIENT)
Dept: OUTPATIENT SERVICES | Facility: HOSPITAL | Age: 35
LOS: 1 days | End: 2020-12-23
Payer: MEDICAID

## 2020-12-23 DIAGNOSIS — Z20.828 CONTACT WITH AND (SUSPECTED) EXPOSURE TO OTHER VIRAL COMMUNICABLE DISEASES: ICD-10-CM

## 2020-12-23 DIAGNOSIS — Z94.4 LIVER TRANSPLANT STATUS: Chronic | ICD-10-CM

## 2020-12-23 PROCEDURE — U0003: CPT

## 2020-12-24 DIAGNOSIS — Z20.828 CONTACT WITH AND (SUSPECTED) EXPOSURE TO OTHER VIRAL COMMUNICABLE DISEASES: ICD-10-CM

## 2020-12-24 PROBLEM — Z94.4 LIVER TRANSPLANT STATUS: Chronic | Status: ACTIVE | Noted: 2019-10-02

## 2020-12-24 LAB — SARS-COV-2 RNA SPEC QL NAA+PROBE: SIGNIFICANT CHANGE UP

## 2021-04-30 NOTE — ED ADULT NURSE NOTE - NSIMPLEMENTINTERV_GEN_ALL_ED
show
Implemented All Universal Safety Interventions:  [place] to call system. Call bell, personal items and telephone within reach. Instruct patient to call for assistance. Room bathroom lighting operational. Non-slip footwear when patient is off stretcher. Physically safe environment: no spills, clutter or unnecessary equipment. Stretcher in lowest position, wheels locked, appropriate side rails in place.

## 2021-08-30 ENCOUNTER — NON-APPOINTMENT (OUTPATIENT)
Age: 36
End: 2021-08-30

## 2021-08-31 ENCOUNTER — APPOINTMENT (OUTPATIENT)
Dept: CARDIOLOGY | Facility: CLINIC | Age: 36
End: 2021-08-31
Payer: MEDICAID

## 2021-08-31 ENCOUNTER — NON-APPOINTMENT (OUTPATIENT)
Age: 36
End: 2021-08-31

## 2021-08-31 VITALS
HEART RATE: 85 BPM | OXYGEN SATURATION: 96 % | BODY MASS INDEX: 34.06 KG/M2 | WEIGHT: 217 LBS | SYSTOLIC BLOOD PRESSURE: 136 MMHG | HEIGHT: 67 IN | DIASTOLIC BLOOD PRESSURE: 88 MMHG

## 2021-08-31 DIAGNOSIS — K75.4 AUTOIMMUNE HEPATITIS: ICD-10-CM

## 2021-08-31 DIAGNOSIS — Z94.4 LIVER TRANSPLANT STATUS: ICD-10-CM

## 2021-08-31 DIAGNOSIS — R55 SYNCOPE AND COLLAPSE: ICD-10-CM

## 2021-08-31 DIAGNOSIS — I10 ESSENTIAL (PRIMARY) HYPERTENSION: ICD-10-CM

## 2021-08-31 DIAGNOSIS — Z87.19 PERSONAL HISTORY OF OTHER DISEASES OF THE DIGESTIVE SYSTEM: ICD-10-CM

## 2021-08-31 PROCEDURE — 93000 ELECTROCARDIOGRAM COMPLETE: CPT

## 2021-08-31 PROCEDURE — 99203 OFFICE O/P NEW LOW 30 MIN: CPT

## 2021-08-31 RX ORDER — AMLODIPINE BESYLATE 10 MG/1
10 TABLET ORAL DAILY
Qty: 90 | Refills: 0 | Status: ACTIVE | COMMUNITY

## 2021-08-31 RX ORDER — TACROLIMUS 1 MG/1
1 CAPSULE, GELATIN COATED ORAL TWICE DAILY
Refills: 0 | Status: ACTIVE | COMMUNITY

## 2021-08-31 RX ORDER — LISINOPRIL 5 MG/1
5 TABLET ORAL DAILY
Qty: 30 | Refills: 0 | Status: ACTIVE | COMMUNITY

## 2021-09-01 PROBLEM — I10 HTN (HYPERTENSION), BENIGN: Status: ACTIVE | Noted: 2021-08-31

## 2021-09-01 PROBLEM — Z94.4 LIVER TRANSPLANTED: Status: ACTIVE | Noted: 2021-08-31

## 2021-09-01 PROBLEM — R55 SYNCOPE: Status: ACTIVE | Noted: 2021-08-27

## 2021-09-01 NOTE — DISCUSSION/SUMMARY
[FreeTextEntry1] : The patient is a 36-year-old gentleman s/p liver transplant secondary to autoimmune hepatitis C, hypertension with syncope most likely dehydration. \par #1 CV- normal ECG, echo and exercise stress test to confirm\par #2 Htn- safe to continue amlodipine and lisinopril but BP borderline today\par #3 GI- s/p transplant on prograff, needs level\par #4 General- We discussed adherence to a Mediterranean diet, weight loss and at least 30 minutes of daily exercise.\par

## 2021-09-01 NOTE — HISTORY OF PRESENT ILLNESS
[FreeTextEntry1] : Kurtis is a 36-year-old gentleman liver transplant at 17 yo secondary to autoimmune hepatitis C, hypertension who was sent for cardiac evaluation. He had a near syncopal episode secondary to dehydration.

## 2021-09-27 ENCOUNTER — APPOINTMENT (OUTPATIENT)
Dept: CV DIAGNOSTICS | Facility: HOSPITAL | Age: 36
End: 2021-09-27

## 2021-09-27 ENCOUNTER — OUTPATIENT (OUTPATIENT)
Dept: OUTPATIENT SERVICES | Facility: HOSPITAL | Age: 36
LOS: 1 days | End: 2021-09-27
Payer: MEDICARE

## 2021-09-27 DIAGNOSIS — I10 ESSENTIAL (PRIMARY) HYPERTENSION: ICD-10-CM

## 2021-09-27 DIAGNOSIS — Z94.4 LIVER TRANSPLANT STATUS: Chronic | ICD-10-CM

## 2021-09-27 PROCEDURE — 93018 CV STRESS TEST I&R ONLY: CPT

## 2021-09-27 PROCEDURE — 93016 CV STRESS TEST SUPVJ ONLY: CPT

## 2021-09-27 PROCEDURE — 93017 CV STRESS TEST TRACING ONLY: CPT

## 2021-09-28 RX ORDER — HYDROCHLOROTHIAZIDE 12.5 MG/1
12.5 TABLET ORAL
Qty: 25 | Refills: 2 | Status: ACTIVE | COMMUNITY
Start: 1900-01-01 | End: 1900-01-01

## 2021-10-25 ENCOUNTER — APPOINTMENT (OUTPATIENT)
Dept: CV DIAGNOSITCS | Facility: HOSPITAL | Age: 36
End: 2021-10-25

## 2022-03-05 NOTE — PATIENT PROFILE ADULT - FALL HARM RISK TYPE OF ASSESSMENT
(0) lying quietly, normal position, moves easily/(0) content, relaxed/(0) no cry (awake or asleep)/(0) no particular expression or smile/(0) normal position or relaxed admission

## 2022-04-11 ENCOUNTER — EMERGENCY (EMERGENCY)
Facility: HOSPITAL | Age: 37
LOS: 1 days | Discharge: HOME | End: 2022-04-11
Attending: EMERGENCY MEDICINE | Admitting: EMERGENCY MEDICINE
Payer: SELF-PAY

## 2022-04-11 VITALS
HEART RATE: 83 BPM | SYSTOLIC BLOOD PRESSURE: 171 MMHG | WEIGHT: 184.97 LBS | TEMPERATURE: 98 F | HEIGHT: 67 IN | DIASTOLIC BLOOD PRESSURE: 126 MMHG | OXYGEN SATURATION: 96 % | RESPIRATION RATE: 18 BRPM

## 2022-04-11 VITALS
DIASTOLIC BLOOD PRESSURE: 101 MMHG | HEART RATE: 80 BPM | OXYGEN SATURATION: 95 % | SYSTOLIC BLOOD PRESSURE: 155 MMHG | RESPIRATION RATE: 16 BRPM

## 2022-04-11 DIAGNOSIS — Z94.4 LIVER TRANSPLANT STATUS: Chronic | ICD-10-CM

## 2022-04-11 PROCEDURE — 99283 EMERGENCY DEPT VISIT LOW MDM: CPT | Mod: 25

## 2022-04-11 PROCEDURE — 12001 RPR S/N/AX/GEN/TRNK 2.5CM/<: CPT

## 2022-04-11 PROCEDURE — 99283 EMERGENCY DEPT VISIT LOW MDM: CPT

## 2022-04-11 RX ORDER — AMLODIPINE BESYLATE 2.5 MG/1
10 TABLET ORAL ONCE
Refills: 0 | Status: COMPLETED | OUTPATIENT
Start: 2022-04-11 | End: 2022-04-11

## 2022-04-11 RX ADMIN — AMLODIPINE BESYLATE 10 MILLIGRAM(S): 2.5 TABLET ORAL at 09:55

## 2022-04-11 NOTE — ED PROVIDER NOTE - CLINICAL SUMMARY MEDICAL DECISION MAKING FREE TEXT BOX
ROSALINDA Youngblood. PGY-3: 35 y/o M presenting with laceration after cutting with , new blade. Will irrigate wound, lac repair. Patient UTD on tetanus. DC with return for removal of sutures

## 2022-04-11 NOTE — ED PROVIDER NOTE - OBJECTIVE STATEMENT
37 y/o M with PMH of hemochromatosis s/p 35 y/o M with PMH of hemochromatosis s/p liver transplant presenting with thumb laceration. Patient states was using a  and cut left thumb. States was using brand new blade, not dirty. Patient has bleeding controlled with gauze at this time.  Got tetanus shot last year.  Patient took all anti-rejection meds this AM, did not take HTN meds

## 2022-04-11 NOTE — ED PROVIDER NOTE - PHYSICAL EXAMINATION
gen: well appearing  Mentation: AAO x 3  psych: mood appropriate  ENT: airway patent  Eyes: conjunctivae clear bilaterally  Cardio: RRR, no m/r/g  Resp: breathing unlabored  Neuro: sensation and motor function intact  Skin: 2 cm laceration on palmar aspect of thumb, just distal to MCP joint  MSK: normal movement of all extremities  Lymph/Vasc: no LE edema

## 2022-04-11 NOTE — ED PROCEDURE NOTE - ATTENDING CONTRIBUTION TO CARE
I reviewed the Fellow's's note and agree with the documented findings and plan of care.  Noreen Baeza MD

## 2022-04-11 NOTE — ED PROVIDER NOTE - NSFOLLOWUPINSTRUCTIONS_ED_ALL_ED_FT
1. Return to ED for worsening, progressive or any other concerning symptoms   2. Sutures to be removed in 7 days    Laceration    A laceration is a cut that goes through all of the layers of the skin and into the tissue that is right under the skin. Some lacerations heal on their own. Others need to be closed with skin adhesive strips, skin glue, stitches (sutures), or staples. Proper laceration care minimizes the risk of infection and helps the laceration to heal better.  If non-absorbable stitches or staples have been placed, they must be taken out within the time frame instructed by your healthcare provider.    SEEK IMMEDIATE MEDICAL CARE IF YOU HAVE ANY OF THE FOLLOWING SYMPTOMS: swelling around the wound, worsening pain, drainage from the wound, red streaking going away from your wound, inability to move finger or toe near the laceration, or discoloration of skin near the laceration.

## 2022-04-11 NOTE — ED PROCEDURE NOTE - PROCEDURE ADDITIONAL DETAILS
Digital block performed with 2.5 mL of lidocaine after cleaning with chlorhexidine. Tourniquet placed at base of the thumb to control bleeding. Small arterial bleed noted. 4 total sutures (4.0 synthetic) placed (1 removed) with successful stopping of the bleeding after removal of tourniquet. -Kacy BALLARD PGY5

## 2022-04-11 NOTE — ED PROVIDER NOTE - PATIENT PORTAL LINK FT
You can access the FollowMyHealth Patient Portal offered by [facility] by registering at the following website: [email or web address]. By joining [employer]’s FollowMyHealth portal, you will also be able to view your health information using other applications (apps) compatible with our system.

## 2022-04-11 NOTE — ED PROVIDER NOTE - ATTENDING CONTRIBUTION TO CARE
I performed a history and physical exam of the patient and discussed their management with the fellow. I reviewed the fellow's note and agree with the documented findings and plan of care.  Noreen Baeza MD

## 2022-04-11 NOTE — ED ADULT NURSE NOTE - OBJECTIVE STATEMENT
0855 36 yr old HM c/o laceration to left thumb 1 hr ago at work on a . A&Ox4. ambulatory. Laceration noted with bleeding at left proximal palmar thumb. full ROM. Past surgical hx of liver transplant 2002. Did not take Norvasc yet this morning. states he took his Prograf. Denies dizziness or HA.

## 2022-04-18 ENCOUNTER — EMERGENCY (EMERGENCY)
Facility: HOSPITAL | Age: 37
LOS: 1 days | Discharge: HOME | End: 2022-04-18
Attending: EMERGENCY MEDICINE
Payer: SELF-PAY

## 2022-04-18 VITALS
OXYGEN SATURATION: 97 % | SYSTOLIC BLOOD PRESSURE: 144 MMHG | DIASTOLIC BLOOD PRESSURE: 92 MMHG | HEART RATE: 74 BPM | TEMPERATURE: 98 F | HEIGHT: 67 IN | WEIGHT: 188.05 LBS | RESPIRATION RATE: 16 BRPM

## 2022-04-18 DIAGNOSIS — Z94.4 LIVER TRANSPLANT STATUS: Chronic | ICD-10-CM

## 2022-04-18 PROBLEM — I10 ESSENTIAL (PRIMARY) HYPERTENSION: Chronic | Status: ACTIVE | Noted: 2022-04-11

## 2022-04-18 PROCEDURE — 99282 EMERGENCY DEPT VISIT SF MDM: CPT

## 2022-04-18 PROCEDURE — G0463: CPT

## 2022-04-18 NOTE — ED PROVIDER NOTE - ATTENDING CONTRIBUTION TO CARE
I performed a history and physical exam of the patient and discussed their management with the resident and /or advanced care provider. I reviewed the resident and /or ACP's note and agree with the documented findings and plan of care. My medical decision making and observations are found above.  Nl rom of thumb, no cellulitis

## 2022-04-18 NOTE — ED PROVIDER NOTE - OBJECTIVE STATEMENT
35 yo male in 35 L presents to the ER for evaluation of left thumb sutures to be removed.  Pt awake alert oriented x3 states "I had these three sutures put in one week ago after cutting my finger with a  accidentally.   PT with no signs / symptoms of infection.   Denies fevers and chills. Denies pain and discomfort at this time.

## 2022-04-18 NOTE — ED PROVIDER NOTE - PATIENT PORTAL LINK FT
You can access the FollowMyHealth Patient Portal offered by [facility] by registering at the following website: [email or web address]. By joining [employer]’s FollowMyHealth portal, you will also be able to view your health information using other applications (apps) compatible with our system.

## 2022-04-18 NOTE — ED PROVIDER NOTE - NSFOLLOWUPINSTRUCTIONS_ED_ALL_ED_FT
Thank you for visiting our Emergency Department, it has been a pleasure taking part in your healthcare. Please follow up with your primary doctor within x48 hours.    Your discharge diagnosis is: suture removal    Return precautions to the Emergency Department include but are not limited to: unrelenting nausea, vomiting, fever, chills, chest pain, shortness of breath, dizziness, abdominal pain, worsening pain, syncope, blood in urine or stool, headache that doesn't resolve, numbness or tingling, loss of sensation, loss of motor function, or any other concerning symptoms.     -- Please use 650mg Tylenol (also called acetaminophen) every 4 hours & 600mg Motrin (also called Advil or ibuprofen) every 6 hours as needed for pain/discomfort/swelling. You can get these without a prescription. Don't use more than 3500mg of Tylenol in any 24-hour period. Make sure your other prescription/over-the-counter medications don't contain any Tylenol so you don't take too much. If you have any stomach discomfort while taking Motrin, you can use TUMS or Pepcid or Zantac (these can all be bought without a prescription).

## 2022-04-18 NOTE — ED PROCEDURE NOTE - PROCEDURE DATE TIME, MLM
Addended by: Samantha Carr on: 9/14/2021 02:08 PM     Modules accepted: Orders, Level of Service 18-Apr-2022 12:51

## 2022-04-18 NOTE — ED PROVIDER NOTE - CLINICAL SUMMARY MEDICAL DECISION MAKING FREE TEXT BOX
s/p laceration to left thumb.  3 sutures to be removed,   dsd placed. s/p laceration to left thumb.  3 sutures to be removed,   dsd placed.  Claudette: clean non infected wound, nl rom of thumb. sutures removed ok for d/c
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
## 2022-06-21 NOTE — ED ADULT NURSE NOTE - CAS DISCH ACCOMP BY
GXT Completed
Self
no children mother  55y/o hiv, brain hemorrhage, father  MI, 4 half siblings a&w/Single

## 2022-10-31 ENCOUNTER — EMERGENCY (EMERGENCY)
Facility: HOSPITAL | Age: 37
LOS: 1 days | Discharge: HOME | End: 2022-10-31
Attending: EMERGENCY MEDICINE
Payer: COMMERCIAL

## 2022-10-31 VITALS
RESPIRATION RATE: 17 BRPM | WEIGHT: 188.94 LBS | OXYGEN SATURATION: 96 % | SYSTOLIC BLOOD PRESSURE: 119 MMHG | HEART RATE: 79 BPM | DIASTOLIC BLOOD PRESSURE: 76 MMHG | HEIGHT: 67 IN | TEMPERATURE: 98 F

## 2022-10-31 DIAGNOSIS — Z94.4 LIVER TRANSPLANT STATUS: Chronic | ICD-10-CM

## 2022-10-31 PROCEDURE — 99284 EMERGENCY DEPT VISIT MOD MDM: CPT

## 2022-10-31 PROCEDURE — 73030 X-RAY EXAM OF SHOULDER: CPT

## 2022-10-31 PROCEDURE — 73030 X-RAY EXAM OF SHOULDER: CPT | Mod: 26,LT

## 2022-10-31 PROCEDURE — 99283 EMERGENCY DEPT VISIT LOW MDM: CPT | Mod: 25

## 2022-10-31 RX ORDER — IBUPROFEN 200 MG
600 TABLET ORAL ONCE
Refills: 0 | Status: COMPLETED | OUTPATIENT
Start: 2022-10-31 | End: 2022-10-31

## 2022-10-31 RX ADMIN — Medication 600 MILLIGRAM(S): at 14:53
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
## 2022-10-31 NOTE — ED PROVIDER NOTE - PROGRESS NOTE DETAILS
Leatha Jeffers MD (PGY-1 EM): patient improved. Discussed XR results. Discussed warning signs and need for sports followup. xr no fx no dislocation seen on prelim read

## 2022-10-31 NOTE — ED PROVIDER NOTE - NSFOLLOWUPCLINICS_GEN_ALL_ED_FT
[facility]  Sports Medicine  [address] [place] [address]  Phone: [phone]  Fax:
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
## 2022-10-31 NOTE — ED PROVIDER NOTE - PHYSICAL EXAMINATION
PHYSICAL EXAM:  CONSTITUTIONAL: Well appearing, awake, alert, oriented to person, place, time/situation and in no apparent distress.  HEAD: Atraumatic  EYES: Clear bilaterally, pupils equal, round and reactive to light.  ENMT: Airway patent, Nasal mucosa clear. Mouth with normal mucosa. Uvula is midline.   CARDIAC: Normal rate, regular rhythm. +S1/S2. No murmurs, rubs or gallops.  RESPIRATORY: Breathing unlabored. Breath sounds clear and equal bilaterally.  ABDOMEN:  Soft, nontender, nondistended. No rebound tenderness or guarding.  NEUROLOGICAL: Alert and oriented, no focal deficits, no motor or sensory deficits. CN2-12 intact. Sensation intact x4 extremities.  SKIN: Skin warm and dry. No evidence of rashes or lesions. no seatbelt signs. no achymosis  MSK: no spinal or paraspinal tenderness, no midline tenderness. ROM head and spine intact. ROM L shoulder intact. Pain to palpation L shoulder.

## 2022-10-31 NOTE — ED PROVIDER NOTE - PATIENT PORTAL LINK FT
You can access the FollowMyHealth Patient Portal offered by [facility] by registering at the following website: [email or web address]. By joining [email or web address]’s FollowMyHealth portal, you will also be able to view your health information using other applications (apps) compatible with our system.

## 2023-03-22 ENCOUNTER — EMERGENCY (EMERGENCY)
Facility: HOSPITAL | Age: 38
LOS: 0 days | Discharge: ANOTHER HOSPITAL | End: 2023-03-22
Attending: EMERGENCY MEDICINE
Payer: MEDICAID

## 2023-03-22 ENCOUNTER — EMERGENCY (EMERGENCY)
Facility: HOSPITAL | Age: 38
LOS: 1 days | Discharge: HOME | End: 2023-03-22
Admitting: EMERGENCY MEDICINE
Payer: MEDICAID

## 2023-03-22 VITALS
SYSTOLIC BLOOD PRESSURE: 146 MMHG | TEMPERATURE: 99 F | DIASTOLIC BLOOD PRESSURE: 100 MMHG | HEART RATE: 87 BPM | OXYGEN SATURATION: 98 % | RESPIRATION RATE: 16 BRPM

## 2023-03-22 VITALS
HEART RATE: 82 BPM | DIASTOLIC BLOOD PRESSURE: 71 MMHG | HEIGHT: 67 IN | OXYGEN SATURATION: 100 % | TEMPERATURE: 99 F | SYSTOLIC BLOOD PRESSURE: 135 MMHG | RESPIRATION RATE: 18 BRPM

## 2023-03-22 VITALS — WEIGHT: 182.98 LBS | HEIGHT: 67 IN
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
## 2023-03-22 DIAGNOSIS — S00.03XA CONTUSION OF SCALP, INITIAL ENCOUNTER: ICD-10-CM

## 2023-03-22 DIAGNOSIS — R11.0 NAUSEA: ICD-10-CM

## 2023-03-22 DIAGNOSIS — S02.19XA OTHER FRACTURE OF BASE OF SKULL, INITIAL ENCOUNTER FOR CLOSED FRACTURE: ICD-10-CM

## 2023-03-22 DIAGNOSIS — F12.90 CANNABIS USE, UNSPECIFIED, UNCOMPLICATED: ICD-10-CM

## 2023-03-22 DIAGNOSIS — Z94.4 LIVER TRANSPLANT STATUS: Chronic | ICD-10-CM

## 2023-03-22 DIAGNOSIS — I10 ESSENTIAL (PRIMARY) HYPERTENSION: ICD-10-CM
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
## 2023-03-22 DIAGNOSIS — R55 SYNCOPE AND COLLAPSE: ICD-10-CM

## 2023-03-22 DIAGNOSIS — R79.89 OTHER SPECIFIED ABNORMAL FINDINGS OF BLOOD CHEMISTRY: ICD-10-CM

## 2023-03-22 DIAGNOSIS — S02.2XXA FRACTURE OF NASAL BONES, INITIAL ENCOUNTER FOR CLOSED FRACTURE: ICD-10-CM

## 2023-03-22 DIAGNOSIS — S09.90XA UNSPECIFIED INJURY OF HEAD, INITIAL ENCOUNTER: ICD-10-CM

## 2023-03-22 DIAGNOSIS — Z94.4 LIVER TRANSPLANT STATUS: ICD-10-CM

## 2023-03-22 DIAGNOSIS — Z20.822 CONTACT WITH AND (SUSPECTED) EXPOSURE TO COVID-19: ICD-10-CM

## 2023-03-22 LAB
ALBUMIN SERPL ELPH-MCNC: 3.8 G/DL — SIGNIFICANT CHANGE UP (ref 3.3–5)
ALP SERPL-CCNC: 104 U/L — SIGNIFICANT CHANGE UP (ref 40–120)
ALT FLD-CCNC: 34 U/L — SIGNIFICANT CHANGE UP (ref 12–78)
ANION GAP SERPL CALC-SCNC: 3 MMOL/L — LOW (ref 5–17)
APPEARANCE UR: ABNORMAL
AST SERPL-CCNC: 32 U/L — SIGNIFICANT CHANGE UP (ref 15–37)
BASOPHILS # BLD AUTO: 0.01 K/UL — SIGNIFICANT CHANGE UP (ref 0–0.2)
BASOPHILS NFR BLD AUTO: 0.2 % — SIGNIFICANT CHANGE UP (ref 0–2)
BILIRUB SERPL-MCNC: 1.1 MG/DL — SIGNIFICANT CHANGE UP (ref 0.2–1.2)
BILIRUB UR-MCNC: ABNORMAL
BUN SERPL-MCNC: 30 MG/DL — HIGH (ref 7–23)
CALCIUM SERPL-MCNC: 9.4 MG/DL — SIGNIFICANT CHANGE UP (ref 8.5–10.1)
CHLORIDE SERPL-SCNC: 104 MMOL/L — SIGNIFICANT CHANGE UP (ref 96–108)
CO2 SERPL-SCNC: 27 MMOL/L — SIGNIFICANT CHANGE UP (ref 22–31)
COLOR SPEC: ABNORMAL
CREAT SERPL-MCNC: 2.04 MG/DL — HIGH (ref 0.5–1.3)
DIFF PNL FLD: ABNORMAL
EGFR: 43 ML/MIN/1.73M2 — LOW
EOSINOPHIL # BLD AUTO: 0.1 K/UL — SIGNIFICANT CHANGE UP (ref 0–0.5)
EOSINOPHIL NFR BLD AUTO: 1.5 % — SIGNIFICANT CHANGE UP (ref 0–6)
FLUAV AG NPH QL: SIGNIFICANT CHANGE UP
FLUBV AG NPH QL: SIGNIFICANT CHANGE UP
GLUCOSE SERPL-MCNC: 119 MG/DL — HIGH (ref 70–99)
GLUCOSE UR QL: NEGATIVE — SIGNIFICANT CHANGE UP
HCT VFR BLD CALC: 50.4 % — HIGH (ref 39–50)
HGB BLD-MCNC: 18 G/DL — HIGH (ref 13–17)
IMM GRANULOCYTES NFR BLD AUTO: 0.3 % — SIGNIFICANT CHANGE UP (ref 0–0.9)
KETONES UR-MCNC: ABNORMAL
LEUKOCYTE ESTERASE UR-ACNC: ABNORMAL
LYMPHOCYTES # BLD AUTO: 1.24 K/UL — SIGNIFICANT CHANGE UP (ref 1–3.3)
LYMPHOCYTES # BLD AUTO: 18.6 % — SIGNIFICANT CHANGE UP (ref 13–44)
MAGNESIUM SERPL-MCNC: 2.1 MG/DL — SIGNIFICANT CHANGE UP (ref 1.6–2.6)
MCHC RBC-ENTMCNC: 28.9 PG — SIGNIFICANT CHANGE UP (ref 27–34)
MCHC RBC-ENTMCNC: 35.7 GM/DL — SIGNIFICANT CHANGE UP (ref 32–36)
MCV RBC AUTO: 81 FL — SIGNIFICANT CHANGE UP (ref 80–100)
MONOCYTES # BLD AUTO: 0.74 K/UL — SIGNIFICANT CHANGE UP (ref 0–0.9)
MONOCYTES NFR BLD AUTO: 11.1 % — SIGNIFICANT CHANGE UP (ref 2–14)
NEUTROPHILS # BLD AUTO: 4.55 K/UL — SIGNIFICANT CHANGE UP (ref 1.8–7.4)
NEUTROPHILS NFR BLD AUTO: 68.3 % — SIGNIFICANT CHANGE UP (ref 43–77)
NITRITE UR-MCNC: NEGATIVE — SIGNIFICANT CHANGE UP
PH UR: 5 — SIGNIFICANT CHANGE UP (ref 5–8)
PHOSPHATE SERPL-MCNC: 3.9 MG/DL — SIGNIFICANT CHANGE UP (ref 2.5–4.5)
PLATELET # BLD AUTO: 232 K/UL — SIGNIFICANT CHANGE UP (ref 150–400)
POTASSIUM SERPL-MCNC: 3.7 MMOL/L — SIGNIFICANT CHANGE UP (ref 3.5–5.3)
POTASSIUM SERPL-SCNC: 3.7 MMOL/L — SIGNIFICANT CHANGE UP (ref 3.5–5.3)
PROT SERPL-MCNC: 8.6 GM/DL — HIGH (ref 6–8.3)
PROT UR-MCNC: 30 MG/DL
RBC # BLD: 6.22 M/UL — HIGH (ref 4.2–5.8)
RBC # FLD: 14.1 % — SIGNIFICANT CHANGE UP (ref 10.3–14.5)
RSV RNA NPH QL NAA+NON-PROBE: SIGNIFICANT CHANGE UP
SARS-COV-2 RNA SPEC QL NAA+PROBE: SIGNIFICANT CHANGE UP
SODIUM SERPL-SCNC: 134 MMOL/L — LOW (ref 135–145)
SP GR SPEC: 1.02 — SIGNIFICANT CHANGE UP (ref 1.01–1.02)
TROPONIN I, HIGH SENSITIVITY RESULT: 3.57 NG/L — SIGNIFICANT CHANGE UP
UROBILINOGEN FLD QL: 4
WBC # BLD: 6.66 K/UL — SIGNIFICANT CHANGE UP (ref 3.8–10.5)
WBC # FLD AUTO: 6.66 K/UL — SIGNIFICANT CHANGE UP (ref 3.8–10.5)

## 2023-03-22 PROCEDURE — 76376 3D RENDER W/INTRP POSTPROCES: CPT | Mod: 26

## 2023-03-22 PROCEDURE — 84100 ASSAY OF PHOSPHORUS: CPT

## 2023-03-22 PROCEDURE — 73030 X-RAY EXAM OF SHOULDER: CPT | Mod: 26,RT

## 2023-03-22 PROCEDURE — 70486 CT MAXILLOFACIAL W/O DYE: CPT | Mod: 26,MA

## 2023-03-22 PROCEDURE — 73030 X-RAY EXAM OF SHOULDER: CPT | Mod: RT

## 2023-03-22 PROCEDURE — 0241U: CPT

## 2023-03-22 PROCEDURE — 76376 3D RENDER W/INTRP POSTPROCES: CPT

## 2023-03-22 PROCEDURE — 85025 COMPLETE CBC W/AUTO DIFF WBC: CPT

## 2023-03-22 PROCEDURE — 70450 CT HEAD/BRAIN W/O DYE: CPT | Mod: 26,MA

## 2023-03-22 PROCEDURE — 70450 CT HEAD/BRAIN W/O DYE: CPT | Mod: MA

## 2023-03-22 PROCEDURE — 83735 ASSAY OF MAGNESIUM: CPT

## 2023-03-22 PROCEDURE — 99285 EMERGENCY DEPT VISIT HI MDM: CPT

## 2023-03-22 PROCEDURE — 99285 EMERGENCY DEPT VISIT HI MDM: CPT | Mod: 25

## 2023-03-22 PROCEDURE — 81001 URINALYSIS AUTO W/SCOPE: CPT

## 2023-03-22 PROCEDURE — 84484 ASSAY OF TROPONIN QUANT: CPT

## 2023-03-22 PROCEDURE — 70486 CT MAXILLOFACIAL W/O DYE: CPT | Mod: MA

## 2023-03-22 PROCEDURE — 72125 CT NECK SPINE W/O DYE: CPT | Mod: MA

## 2023-03-22 PROCEDURE — 96374 THER/PROPH/DIAG INJ IV PUSH: CPT

## 2023-03-22 PROCEDURE — 93005 ELECTROCARDIOGRAM TRACING: CPT

## 2023-03-22 PROCEDURE — 80053 COMPREHEN METABOLIC PANEL: CPT

## 2023-03-22 PROCEDURE — 72125 CT NECK SPINE W/O DYE: CPT | Mod: 26,MA

## 2023-03-22 PROCEDURE — 93010 ELECTROCARDIOGRAM REPORT: CPT

## 2023-03-22 PROCEDURE — 36415 COLL VENOUS BLD VENIPUNCTURE: CPT

## 2023-03-22 RX ORDER — SODIUM CHLORIDE 9 MG/ML
1000 INJECTION INTRAMUSCULAR; INTRAVENOUS; SUBCUTANEOUS ONCE
Refills: 0 | Status: COMPLETED | OUTPATIENT
Start: 2023-03-22 | End: 2023-03-22

## 2023-03-22 RX ORDER — AMPICILLIN SODIUM AND SULBACTAM SODIUM 250; 125 MG/ML; MG/ML
3 INJECTION, POWDER, FOR SUSPENSION INTRAMUSCULAR; INTRAVENOUS ONCE
Refills: 0 | Status: COMPLETED | OUTPATIENT
Start: 2023-03-22 | End: 2023-03-22

## 2023-03-22 RX ADMIN — AMPICILLIN SODIUM AND SULBACTAM SODIUM 200 GRAM(S): 250; 125 INJECTION, POWDER, FOR SUSPENSION INTRAMUSCULAR; INTRAVENOUS at 21:45

## 2023-03-22 RX ADMIN — SODIUM CHLORIDE 2000 MILLILITER(S): 9 INJECTION INTRAMUSCULAR; INTRAVENOUS; SUBCUTANEOUS at 18:44

## 2023-03-22 RX ADMIN — SODIUM CHLORIDE 2000 MILLILITER(S): 9 INJECTION INTRAMUSCULAR; INTRAVENOUS; SUBCUTANEOUS at 19:38

## 2023-03-22 NOTE — ED STATDOCS - PHYSICAL EXAMINATION
General: AAOx3, NAD  HEENT: +ecchymosis and tenderness of R superior orbital bone, no paplpable stepoffs. PERRLA, EOMI. +small hematoma of occiput  Cardiac: Normal rate and rhythym, no murmurs, normal peripheral perfusion  Respiratory: Normal rate and effort. CTAB  GI: Soft, nondistended, nontender  Neuro: No focal deficits. CARABALLO equally x4, sensation to light touch intact throughout  MSK: FROMx4, no focal bony tenderness, no peripheral edema  Skin: No rash

## 2023-03-22 NOTE — ED STATDOCS - PROGRESS NOTE DETAILS
d/w omfs via transfer center, would like pt to be transferred to be evaluated by omfs or haroldo Power PA-C Patient seen and evaluated, ED attending note and orders reviewed, will continue with patient follow up and care -Melly Power PA-C labs with elevated Cr, likely dehydration, pt states he has not been drinking enough water, feels that is the reason he passed out last night, will hydrate with 2L IVF.  CTs with No acute intracranial hemorrhage  Depressed fracture left lamina papyracea. Fracture of the anterior nasal septum. No acute fracture cervical spine.  EOMI, no visual changes, will contact omfs via transfer center  Melly Power PA-C

## 2023-03-22 NOTE — ED ADULT TRIAGE NOTE - CHIEF COMPLAINT QUOTE
Pt presents to the ED s/p 2 syncopal episodes last night. Pt reports hitting the back of his head on a tile floor. Denies blood thinner use, cardiac history, n/v, blurred vision CP. Pt states "last time this happened I was dehydrated and had a low potassium." Pt c/o HA and states "it feels like I have a concussion." Pt sent for EKG.

## 2023-03-22 NOTE — ED STATDOCS - CLINICAL SUMMARY MEDICAL DECISION MAKING FREE TEXT BOX
Pt with hx remote liver transplant compliant with prograf, no cardiac hx p/w syncope x2 last night with head injury. Now with HA, no further light headedness. Hx of similar from electrolyte disturbance. EKG unremarkable. Plan for CT head/Cspine/Maxillofacial, labs, IVF, reeval. Low suspicion for cardiac etiology syncope, if w/u neg can reassess for DC with otpt fu

## 2023-03-22 NOTE — ED STATDOCS - ATTENDING APP SHARED VISIT CONTRIBUTION OF CARE
I, Rubens Caba MD, personally saw the patient with DAVID.  I have personally performed a face to face diagnostic evaluation on this patient.  I have reviewed the DAVID note and agree with the history, exam, and plan of care, except as noted.

## 2023-03-22 NOTE — ED ADULT TRIAGE NOTE - CHIEF COMPLAINT QUOTE
patient transfer from [place] for OMFS. right orbital fracture from 2 syncopal episodes yesterday. No blood thinners. PMH hypertension, livertransplant. Pt denies chest pain sob n/v.

## 2023-03-22 NOTE — ED STATDOCS - OBJECTIVE STATEMENT
37M hx liver transplant 2002 at [facility], hx HTN presents with syncope and head injury. Pt reports last night around midnight stood up from couch and became light headed, passed out and landed on floor. Hit back of head and frontal region. Stood up and passed out again. Reports this has happened before from low potassium. Was feeling ebtter today and went to work but now having headaches, mild nausea no vomiting, no vision changes. No CP/SOB. Denies alcohol, was smoking marijuana last night

## 2023-03-23 LAB
ALBUMIN SERPL ELPH-MCNC: 4.2 G/DL — SIGNIFICANT CHANGE UP (ref 3.3–5)
ALP SERPL-CCNC: 94 U/L — SIGNIFICANT CHANGE UP (ref 40–120)
ALT FLD-CCNC: 29 U/L — SIGNIFICANT CHANGE UP (ref 4–41)
ANION GAP SERPL CALC-SCNC: 13 MMOL/L — SIGNIFICANT CHANGE UP (ref 7–14)
AST SERPL-CCNC: 40 U/L — SIGNIFICANT CHANGE UP (ref 4–40)
BILIRUB SERPL-MCNC: 1.2 MG/DL — SIGNIFICANT CHANGE UP (ref 0.2–1.2)
BUN SERPL-MCNC: 24 MG/DL — HIGH (ref 7–23)
CALCIUM SERPL-MCNC: 9.1 MG/DL — SIGNIFICANT CHANGE UP (ref 8.4–10.5)
CHLORIDE SERPL-SCNC: 97 MMOL/L — LOW (ref 98–107)
CO2 SERPL-SCNC: 22 MMOL/L — SIGNIFICANT CHANGE UP (ref 22–31)
CREAT SERPL-MCNC: 1.57 MG/DL — HIGH (ref 0.5–1.3)
EGFR: 59 ML/MIN/1.73M2 — LOW
GLUCOSE SERPL-MCNC: 105 MG/DL — HIGH (ref 70–99)
POTASSIUM SERPL-MCNC: 3.3 MMOL/L — LOW (ref 3.5–5.3)
POTASSIUM SERPL-SCNC: 3.3 MMOL/L — LOW (ref 3.5–5.3)
PROT SERPL-MCNC: 7.8 G/DL — SIGNIFICANT CHANGE UP (ref 6–8.3)
SODIUM SERPL-SCNC: 132 MMOL/L — LOW (ref 135–145)
TROPONIN T, HIGH SENSITIVITY RESULT: 7 NG/L — SIGNIFICANT CHANGE UP

## 2023-03-23 NOTE — ED PROVIDER NOTE - PATIENT PORTAL LINK FT
You can access the FollowMyHealth Patient Portal offered by [facility] by registering at the following website: [email or web address]. By joining [employer]’s FollowMyHealth portal, you will also be able to view your health information using other applications (apps) compatible with our system.

## 2023-03-23 NOTE — CONSULT NOTE ADULT - ASSESSMENT
Assessment and Recommendation:   37y Male s/p fall presents with non-displaced nasal bone fractures    Plan: PENDING DISCUSSION WITH AN ATTENDING  - Sinus precautions (Sneeze with mouth open.  Do not blow nose/use straws/smoke/spit)   - Augmentin 875mg BID   - Pain control per ED  - No acute intervention indicated from an OMFS perspective at this time. Patient should follow up as an outpatient at [facility] in one week.     [facility]   Address: [phone] [place] [address]  Phone: [phone]    Rubens Pop DDS   Oral and Maxillofacial Surgery   Pager #[phone]  Available on [employer] Teams     Assessment and Recommendation:   37y Male s/p fall presents with non-displaced nasal bone fractures    Plan:   - Sinus precautions (Sneeze with mouth open.  Do not blow nose/use straws/smoke/spit)   - Augmentin 875mg BID   - Pain control per ED  - No acute intervention indicated from an OMFS perspective at this time. Patient should follow up as an outpatient at [facility] OMFS [facility] in one week.     [facility]ill[facility]   Address: [phone] [place] [address]  Phone: [phone]    Rubens Pop DDS   Oral and Maxillofacial Surgery   Pager #[phone]  Available on [employer] Teams

## 2023-03-23 NOTE — CONSULT NOTE ADULT - SUBJECTIVE AND OBJECTIVE BOX
SKY PORTER 37y  MRN-[id number]    Patient is a 37y Male who presents to [facility] ED s/p fall    HPI:   37y Male with past medical history significant for liver transplant and HTN presents to [facility] ED as a transfer from [facility] s/p fall. Patient reports he was dehydrated. Patient fell 2 years ago do to dehydration. OMFS was consulted for facial fractures noted on CT.     PMH: Liver transplant, HTN   Meds: Tacrolimus, Lisinopril  PSH: Liver transplant   Allergies: NKDA     SOCIAL HISTORY:   ETOH use: Denies   Tobacco use:  Denies   Recreational drug use: Edibles     Review of Systems: Patient denies loss of consciousness, fever, chills, nausea, vomiting, headache, CP, SOB, cough, palpitations, blurred vision/double vision, dysphagia, dyspnea.      Physical Exam:   Gen: AAOx3, NAD   Head: No edema/tenderness to palpation, no abrasions, no lacerations, no ecchymosis    Eyes: EOMI, PERRL, visual acuity intact, no diplopia, supra/infra orbital rims intact, no subconjunctival heme, no telecanthus, no exophthalmos   Ears: Gross hearing intact, no heme appreciated, condylar head palpated bilaterally, no otorrhea    Nose: No septal hematoma/asymmetry, no epistaxis bilaterally, no rhinorrhea, no abrasions present, no lacerations,  Malar: No malar depression, no CN V-2 paresthesia   Throat: No LAD, supple, trachea midline  Extraoral/Intraoral Exam: ANTOINE: 30, dentition grossly intact, occlusion is stable and reproducible, no CN V-3 paresthesia, floor of mouth soft and nonraised, no mobility of maxilla/crepitis, TMJ: No clicking/popping     Vitals:   Vital Signs Last 24 Hrs  T(C): 37.2 (22 Mar 2023 22:32), Max: 37.2 (22 Mar 2023 22:32)  T(F): 98.9 (22 Mar 2023 22:32), Max: 98.9 (22 Mar 2023 22:32)  HR: 82 (22 Mar 2023 22:32) (82 - 103)  BP: 135/71 (22 Mar 2023 22:32) (135/71 - 146/100)  BP(mean): 109 (22 Mar 2023 17:25) (109 - 109)  RR: 18 (22 Mar 2023 22:32) (16 - 20)  SpO2: 100% (22 Mar 2023 22:32) (97% - 100%)    Parameters below as of 22 Mar 2023 22:32  Patient On (Oxygen Delivery Method): room air                            18.0   6.66  )-----------( 232      ( 22 Mar 2023 17:43 )             50.4       CT Maxillofacial:  FINDINGS:  Head and facial bone:  There is no acute intracranial hemorrhage, parenchymal mass, mass effect or midline shift. There is no acute territorial infarct. There is no hydrocephalus.  The cranium is intact.  Depressed fracture left lamina papyracea noted. There is fracture of the anterior nasal septum.  Mild mucosal thickening maxillary sinuses  The globes are intact.  Cervical spine  The vertebral body heights and alignment are maintained. There is no acute fracture.  There is no prevertebral soft tissue swelling. The odontoid is intact.  The evaluation of the spinal canal is limited on a CT exam. Degenerative changes noted  IMPRESSION:  No acute intracranial hemorrhage  Depressed fracture left lamina papyracea. Fracture of the anterior nasal septum.  No acute fracture cervical spine. If pain persists, follow-up MRI exam recommendeD

## 2023-03-23 NOTE — ED PROVIDER NOTE - OBJECTIVE STATEMENT
Patient is a 37 y.o male with PMHx of hemochromatosis s/p Liver transplant, HTN who presents to ED as transfer from [facility] for OMFS evaluation for CT showing "Depressed fracture left lamina papyracea. Fracture of the anterior nasal septum" s/p syncopal episode. Patient states that around midnight last night he got up to use the bathroom and felt dizzy states he ended up passing out and landed on floor. Pt tried to get up to soon causing him to fall again. Pt presented to [facility] hospital complaining of HA and nausea. At [facility] he had labs showing slightly elevated CR of 2.0, Trop I was 3.78, ECG NSR, CT head normal, CT max/face + for fracute, Xray normal, UA normal. Pt transferred for OMFS eval. Pt denies current complaints. Denies HA, dizziness, changes in vision, n/v. States he would like to go home. Pt admits to prior syncopal episodes in past. Follows with cardiology. Had stress test 2 yrs ago that was normal.

## 2023-03-23 NOTE — ED PROVIDER NOTE - PHYSICAL EXAMINATION
Vital signs reviewed.   CONSTITUTIONAL: Well-appearing;  in no apparent distress. Non-toxic appearing.   HEAD: Normocephalic  EYES: PERRL, EOM intact, conjunctiva and sclera WNL. No signs of entrapment. No raccoon eyes.   ENT: normal nose; no rhinorrhea;   NECK:Trachea midline   RESP: NAD  EXT/MS: moves all extremities;   SKIN: Normal for age and race; warm; dry; good turgor; Area of ecchymosis noted superior and lateral to Rt eye with TTP appreciated.   NEURO: Awake, alert, oriented x 3, no gross deficits  PSYCH: Normal mood; appropriate affect.

## 2023-03-23 NOTE — ED PROVIDER NOTE - NSFOLLOWUPINSTRUCTIONS_ED_ALL_ED_FT
Patient advised to follow up with PRIMARY CARE DOCTOR IN 1-2 DAYS AND ORAL MAXILLARY FACIAL SURGERY IN 1 WEEK and told to return to the emergency department immediately for any new or concerning symptoms  OR ANY OTHER COMPLAINTS. Patient agrees with plan.      [facility]ill[facility]   Address: [phone] [place] [address]  Phone: [phone]    Take antibiotic as directed   Sinus precautions (Sneeze with mouth open.  Do not blow nose/use straws/smoke/spit)  Take motrin 600 mg every 6 hours as needed for pain     Advance activity as tolerated.  Continue all previously prescribed medications as directed unless otherwise instructed.  Follow up with your primary care physician in 48-72 hours- bring copies of your results.  Return to the ER for worsening or persistent symptoms, and/or ANY NEW OR CONCERNING SYMPTOMS. If you have issues obtaining follow up, please call: [phone] to obtain a doctor or specialist who takes your insurance in your area.  You may call [phone] to make an appointment with the internal medicine clinic. Patient advised to follow up with PRIMARY CARE DOCTOR IN 1-2 DAYS AND ORAL MAXILLARY FACIAL SURGERY IN 1 WEEK and told to return to the emergency department immediately for any new or concerning symptoms  OR ANY OTHER COMPLAINTS. Patient agrees with plan.      [facility]ill[facility]   Address: [phone] [place] [address]  Phone: [phone]    Stay hydrated   Take antibiotic as directed   Sinus precautions (Sneeze with mouth open.  Do not blow nose/use straws/smoke/spit)  tylenol 650 mg every 6 hours as needed .     Advance activity as tolerated.  Continue all previously prescribed medications as directed unless otherwise instructed.  Follow up with your primary care physician in 48-72 hours- bring copies of your results.  Return to the ER for worsening or persistent symptoms, and/or ANY NEW OR CONCERNING SYMPTOMS. If you have issues obtaining follow up, please call: [phone] to obtain a doctor or specialist who takes your insurance in your area.  You may call [phone] to make an appointment with the internal medicine clinic.

## 2023-03-23 NOTE — ED PROVIDER NOTE - PROGRESS NOTE DETAILS
Patient seen by OMFS, no acute intervention. Recommend ABX and sinus precaution and outpatient follow up. Trop neg. Cr improving. Pt stable for DC and outpatient follow up.

## 2023-12-10 NOTE — ED ADULT TRIAGE NOTE - MODE OF ARRIVAL
Talia eHrnandez was seen and treated in our emergency department on 12/10/2023. off school 12/11/23    Diagnosis:     [place]  . He may return on this date: If you have any questions or concerns, please don't hesitate to call.       Cate Church, DO    ______________________________           _______________          _______________  Hospital Representative                              Date                                Time Walk in

## 2024-02-08 NOTE — ED STATDOCS - CPE ED CARDIAC NORM
ICU to Wards Transfer  Internal Medicine Wards Acceptance Note   [facility]        The ICU-PAUSE transfer documentation has been acknowledged and reviewed by the wards team.  Additionally, the wards team has received official sign-out from the ICU team with acceptance of care of the patient.      At the Time of transfer, patient was seen and examined by the wards team with the findings below:    Review of Systems    Constitutional:  Negative for activity change, appetite change and fatigue.   HENT:  Negative for congestion.    Respiratory:  Negative for cough and shortness of breath.    Cardiovascular:  Negative for chest pain.   Gastrointestinal:  Positive for blood in stool. Negative for abdominal distention, abdominal pain, constipation, diarrhea, nausea and vomiting.   Genitourinary:  Negative for difficulty urinating, frequency, hematuria and urgency.   Neurological:  Negative for dizziness, syncope, weakness and headaches.    Physical Exam  Constitutional:       Appearance: He is ill-appearing.   Cardiovascular:      Rate and Rhythm: Normal rate and regular rhythm.      Heart sounds: No murmur heard.     No gallop.   Pulmonary:      Effort: Pulmonary effort is normal. No respiratory distress.      Breath sounds: Normal breath sounds. No wheezing.   Abdominal:      General: There is no distension.      Palpations: Abdomen is soft.      Tenderness: There is no abdominal tenderness.   Musculoskeletal:      Right lower leg: No edema.      Left lower leg: No edema.   Neurological:      Mental Status: He is alert and oriented to person, place, and time.         Updates to Assessment & Plan at this time:    AoC normocytic Anemia  2/2 GI bleed   Hx of diverticulosis   Episode of BRBPR at home and passage of clot in ED. VS stable. Hgb 8.9 on arrival  CT abdomen WO contrast: Severely limited study due to lack of intravenous contrast. No gross   sequelae of acute gastrointestinal hemorrhage. If  normal...

## 2025-02-27 NOTE — PATIENT PROFILE ADULT - TOBACCO USE
[None] : none [Well groomed] : well groomed [Cooperative] : cooperative [Euthymic] : euthymic [Full] : full [Clear] : clear [Linear/Goal Directed] : linear/goal directed [Average] : average [WNL] : within normal limits [FreeTextEntry8] : "better" Never smoker

## 2025-04-08 NOTE — ED ADULT TRIAGE NOTE - BSA (M2)
RM:________     PCP: Pallares, Clara Ann, MD                                     Last EKG :  2024    : 1944  AGE: 81 y.o.    REASON FOR VISIT: __________________________________________    ____________________________________________________________                                                       Wt Readings from Last 3 Encounters:   24 63.9 kg (140 lb 12.8 oz)   24 60.8 kg (134 lb)   23 62.1 kg (137 lb)      BP Readings from Last 3 Encounters:   24 132/72   24 128/80   23 122/78          WT: ____________ HT: ______ BP: __________ HR ______   02% _______                                           1.97